# Patient Record
Sex: MALE | Race: WHITE | Employment: OTHER | URBAN - METROPOLITAN AREA
[De-identification: names, ages, dates, MRNs, and addresses within clinical notes are randomized per-mention and may not be internally consistent; named-entity substitution may affect disease eponyms.]

---

## 2017-06-29 ENCOUNTER — PATIENT OUTREACH (OUTPATIENT)
Dept: FAMILY MEDICINE CLINIC | Age: 67
End: 2017-06-29

## 2017-06-29 NOTE — LETTER
6/29/2017 3:12 PM 
 
Mr. Doris Morin [address] [address] [place] [address] I am a Nurse Navigator here at [address] working with Dr. Sharifa Randall. We are reaching out in efforts to schedule an appointment to facilitate an office visit and address gaps in care brought up to our attention by your insurance company. Anabelle Alvarado will like you to undergo a Medicare Wellness visit. Please call our office to facilitate. Thank you for allowing us to participate in your care!  
 
 
 
Sincerely, 
 
 
Leana Roth LPN

## 2017-06-29 NOTE — PROGRESS NOTES
Attempting to contact patient in efforts to schedule an appointment to address [facility]. I preceded to send a  letter requesting a return call to facilitate office visit.

## 2018-05-21 ENCOUNTER — PATIENT OUTREACH (OUTPATIENT)
Dept: FAMILY MEDICINE CLINIC | Age: 68
End: 2018-05-21

## 2018-05-21 NOTE — PROGRESS NOTES
Outreach made to this patient to schedule a Medicare Wellness Visit. This visit scheduled with Dr. Veronica Garcia on 6/4/18 at 4:05 pm. Contact information provided to this patient for any future questions or concerns.

## 2018-10-05 ENCOUNTER — PATIENT OUTREACH (OUTPATIENT)
Dept: FAMILY MEDICINE CLINIC | Age: 68
End: 2018-10-05

## 2018-10-05 NOTE — PROGRESS NOTES
Outreach made to this patient in efforts of scheduling an appointment for a Medicare Wellness Visit. Voice message left for this patient to return a call to this office to schedule this needed appointment.

## 2019-06-14 ENCOUNTER — TELEPHONE (OUTPATIENT)
Dept: FAMILY MEDICINE CLINIC | Age: 69
End: 2019-06-14

## 2019-06-14 NOTE — TELEPHONE ENCOUNTER
Called patient to schedule [facility] appointment. Offered patient appointments and he stated his schedule is \"tied up\" and will call back.

## 2021-03-09 ENCOUNTER — VIRTUAL VISIT (OUTPATIENT)
Dept: FAMILY MEDICINE CLINIC | Age: 71
End: 2021-03-09
Payer: MEDICARE

## 2021-03-09 DIAGNOSIS — I20.8 STABLE ANGINA (HCC): ICD-10-CM

## 2021-03-09 DIAGNOSIS — Z76.89 ENCOUNTER TO ESTABLISH CARE: Primary | ICD-10-CM

## 2021-03-09 DIAGNOSIS — I10 ESSENTIAL HYPERTENSION: ICD-10-CM

## 2021-03-09 PROBLEM — E78.00 PURE HYPERCHOLESTEROLEMIA: Status: ACTIVE | Noted: 2021-03-09

## 2021-03-09 PROBLEM — E11.9 CONTROLLED TYPE 2 DIABETES MELLITUS WITHOUT COMPLICATION, WITHOUT LONG-TERM CURRENT USE OF INSULIN (HCC): Status: ACTIVE | Noted: 2021-03-09

## 2021-03-09 PROBLEM — E55.9 VITAMIN D DEFICIENCY: Status: ACTIVE | Noted: 2021-03-09

## 2021-03-09 PROCEDURE — 3017F COLORECTAL CA SCREEN DOC REV: CPT | Performed by: STUDENT IN AN ORGANIZED HEALTH CARE EDUCATION/TRAINING PROGRAM

## 2021-03-09 PROCEDURE — G8427 DOCREV CUR MEDS BY ELIG CLIN: HCPCS | Performed by: STUDENT IN AN ORGANIZED HEALTH CARE EDUCATION/TRAINING PROGRAM

## 2021-03-09 PROCEDURE — G8432 DEP SCR NOT DOC, RNG: HCPCS | Performed by: STUDENT IN AN ORGANIZED HEALTH CARE EDUCATION/TRAINING PROGRAM

## 2021-03-09 PROCEDURE — G8756 NO BP MEASURE DOC: HCPCS | Performed by: STUDENT IN AN ORGANIZED HEALTH CARE EDUCATION/TRAINING PROGRAM

## 2021-03-09 PROCEDURE — 99204 OFFICE O/P NEW MOD 45 MIN: CPT | Performed by: STUDENT IN AN ORGANIZED HEALTH CARE EDUCATION/TRAINING PROGRAM

## 2021-03-09 PROCEDURE — 1101F PT FALLS ASSESS-DOCD LE1/YR: CPT | Performed by: STUDENT IN AN ORGANIZED HEALTH CARE EDUCATION/TRAINING PROGRAM

## 2021-03-09 RX ORDER — LISINOPRIL 20 MG/1
TABLET ORAL
COMMUNITY
Start: 2021-02-19 | End: 2021-11-03

## 2021-03-09 RX ORDER — ESCITALOPRAM OXALATE 5 MG/1
TABLET ORAL
COMMUNITY
Start: 2021-01-07

## 2021-03-09 RX ORDER — BLOOD-GLUCOSE METER
KIT MISCELLANEOUS
COMMUNITY
Start: 2021-01-07 | End: 2021-10-04 | Stop reason: SDUPTHER

## 2021-03-09 RX ORDER — AMLODIPINE BESYLATE 5 MG/1
TABLET ORAL
COMMUNITY
Start: 2021-02-19 | End: 2021-11-03

## 2021-03-09 NOTE — PROGRESS NOTES
Shana Torres  79 y.o. male  1950  [address] [facility]  [id number]   [address]:    Telemedicine Progress Note  Adrienne Adams MD       Encounter Date and Time: March 9, 2021 at 11:10 AM    Consent: Shana Torres, who was seen by synchronous (real-time) audio-video technology, and/or his healthcare decision maker, is aware that this patient-initiated, Telehealth encounter on 3/9/2021 is a billable service, with coverage as determined by his insurance carrier. He is aware that he may receive a bill and has provided verbal consent to proceed: Yes. Chief Complaint   Patient presents with    Establish Care     History of Present Illness   Shana Torres is a 79 y.o. male was evaluated by synchronous (real-time) audio-video technology from home, through a secure patient portal.  Patient presents today to establish care. He has been going to [facility] for blood work and medication refills. Used to follow with us back in 2017. Since then his wife had a stroke and he has been her caregiver. He provides me today with his medication list (updated in chart). Also tells me that he feels short of breath on exertion in the morning when taking out the trash. He also experiences some chest tightness on exertion and this resolves when he rests. He does not feel that way in the evenings or at rest. He does not have any symptoms when he is doing simple tasks at home. 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 Denies any current CP, SOB, palpitations or LE swelling. His vitals today are 148/75, HR 72 and glucose 100. He was placed on multiple BP meds in 04/2019 after attending one of his wife's medical appts and his BP was found to be 198/122. At the time he was only on Atenolol. Since then Amlodopine, Lisinipril were added.  He was also started on Escitalopram for mild depression in the setting of his wife's condition. Review of Systems   Review of Systems   Respiratory: Negative for shortness of breath. Cardiovascular: Negative for chest pain, palpitations and leg swelling. Vitals/Objective:     General: alert, cooperative, no distress   Mental  status: mental status: alert, oriented to person, place, and time, normal mood, behavior, speech, dress, motor activity, and thought processes   Resp: resp: normal effort and no respiratory distress   Neuro: neuro: no gross deficits   Skin: skin: no discoloration or lesions of concern on visible areas   Due to this being a TeleHealth evaluation, many elements of the physical examination are unable to be assessed. Assessment and Plan:   Valorie Delgado is a 79 y.o. male who presents to establish care. 1. Encounter to establish care  - chart and medications updated  - patient provided with our fax number to fax [facility] medical records/labs over today or tomorrow    2. Essential hypertension  - stable   - Continue medical regimen now, checking BP daily     3. Stable angina (HCC)  - symptoms point towards stable angina in light of patient's history and risk factors. - Considered echo stress however will refer to cardiology for evaluation and work up, patient may need cath   - ED precautions discussed with patient     We discussed the expected course, resolution and complications of the diagnosis(es) in detail. Medication risks, benefits, costs, interactions, and alternatives were discussed as indicated. I advised him to contact the office if his condition worsens, changes or fails to improve as anticipated. He expressed understanding with the diagnosis(es) and plan. Patient understands that this encounter was a temporary measure, and the importance of further follow up and examination was emphasized. Patient verbalized understanding. Patient informed to follow up: with medical records and following cardiac evaluation.     Electronically Signed: Kb Carver Paul Nava MD    Jarvis Whitehead is a 79 y.o. male who was evaluated by an audio-video encounter for concerns as above. Patient identification was verified prior to start of the visit. A caregiver was present when appropriate. Due to this being a TeleHealth encounter (During YTCEK-98 public health emergency), evaluation of the following organ systems was limited: Vitals/Constitutional/EENT/Resp/CV/GI//MS/Neuro/Skin/Heme-Lymph-Imm. Pursuant to the emergency declaration under the [facility], [facility] waiver authority and the [employer] Act, this Virtual Visit was conducted, with patient's (and/or legal guardian's) consent, to reduce the patient's risk of exposure to COVID-19 and provide necessary medical care. Services were provided through a synchronous discussion virtually to substitute for in-person clinic visit. I was at home. The patient was at home. History   Patients past medical, surgical and family histories were reviewed and updated.       Past Medical History:   Diagnosis Date    Diabetes ([facility] [address]     Hypercholesterolemia     Hypertension      Past Surgical History:   Procedure Laterality Date    HX TONSILLECTOMY       Family History   Problem Relation Age of Onset    Heart Attack Mother     Cancer Father      Social History     Tobacco Use    Smoking status: Never Smoker   Substance Use Topics    Alcohol use: Yes     Comment: AT NIGHT AT HOME    Drug use: No     Patient Active Problem List   Diagnosis Code    Essential hypertension I10    Pure hypercholesterolemia E78.00    Controlled type 2 diabetes mellitus without complication, without long-term current use of insulin (HCC) E11.9    Vitamin D deficiency E55.9          Current Medications/Allergies   Medications and Allergies reviewed:    Current Outpatient Medications   Medication Sig Dispense Refill    amLODIPine (NORVASC) 5 mg tablet       FreeStyle Lite Strips strip       escitalopram oxalate (LEXAPRO) 5 mg tablet       lisinopriL (PRINIVIL, ZESTRIL) 20 mg tablet       simvastatin (ZOCOR) 20 mg tablet Take 1 Tab by mouth nightly. 30 Tab 1    atenolol (TENORMIN) 100 mg tablet Take 1 Tab by mouth daily. 30 Tab 1    metFORMIN (GLUCOPHAGE) 1,000 mg tablet Take 1 Tab by mouth two (2) times daily (with meals). 90 Tab 1    fluticasone (FLONASE) 50 mcg/actuation nasal spray 2 Sprays by Both Nostrils route daily.  1 Bottle 0     Not on File

## 2021-03-10 NOTE — PROGRESS NOTES
[address] Medicine Residency Attending Addendum:  Dr. Yudith Parra MD,  the patient and I were not physically present during this encounter. The resident and I are concurrently monitoring the patient care through appropriate telecommunication technology. I discussed the findings, assessment and plan with the resident and agree with the resident's findings and plan as documented in the resident's note.       Catarina Evans MD

## 2021-03-11 ENCOUNTER — DOCUMENTATION ONLY (OUTPATIENT)
Dept: FAMILY MEDICINE CLINIC | Age: 71
End: 2021-03-11

## 2021-03-11 DIAGNOSIS — E11.9 CONTROLLED TYPE 2 DIABETES MELLITUS WITHOUT COMPLICATION, WITHOUT LONG-TERM CURRENT USE OF INSULIN (HCC): Primary | ICD-10-CM

## 2021-03-11 NOTE — PROGRESS NOTES
Records received from [facility]. Will scan into system. Of note, patient last had labwork done on 11/30/2020. TSH 1.79  HbA1c 6.9%   Alb/Cr ratio 93 (mildly increased, on lisinopril)   Lipid panel wnl.  LDL 86   CMP wnl, Cr 1.00  CBC ok, Hb 12.9 (low)     Will plan to obtain follow up A1c     Anahi Manley MD

## 2021-03-12 ENCOUNTER — TELEPHONE (OUTPATIENT)
Dept: FAMILY MEDICINE CLINIC | Age: 71
End: 2021-03-12

## 2021-03-12 NOTE — TELEPHONE ENCOUNTER
----- Message from [place] sent at 3/10/2021 11:22 AM EST -----  Regarding: Dr. Savanna Luciano first and last name:  Walkernathaniel Radha      Reason for call:  Pt would like to proceed with Dr. Maribel Emanuel recommendation for a cardiologist.  Pt was unable to schedule with his wife's cardiologist.  Requesting a call back to coordinate making the initial appt with Dr. Maribel Emanuel recommendation.        Callback required yes/no and why: yes      Best contact number(s):[phone]      Details to clarify the request:  [facility]

## 2021-03-12 NOTE — TELEPHONE ENCOUNTER
I updated patient's Cardio Ref order in CC and Cardio \"should\" call patient to schedule. Patient has [employer] which requires PA so after he gets his scheduled appt call us back with his appt info so that I can work on [address] to his scheduled appt.     Thanks  Ivania Brown S/PSR  [facility] Referral Coordinator

## 2021-03-16 ENCOUNTER — TELEPHONE (OUTPATIENT)
Dept: FAMILY MEDICINE CLINIC | Age: 71
End: 2021-03-16

## 2021-03-16 ENCOUNTER — NURSE TRIAGE (OUTPATIENT)
Dept: OTHER | Facility: CLINIC | Age: 71
End: 2021-03-16

## 2021-03-16 NOTE — TELEPHONE ENCOUNTER
----- Message from Eliazar Coppola sent at 3/16/2021  9:26 AM EDT -----  Regarding: Dr. King Shirley / Elizabet [place] first and last name: Trini Carpenter, wife      Reason for call: Caller informed that pt received the second dose of covid vaccine yesterday and is experiencing a low grade fever of 99.5. Caller also informed that pt is experiencing tightness in his chest and jaw pain as has happened in the past and she would like to request an EKG to be completed on the patient while he is in the lab for his blood work. Callback required yes/no and why: yes, to confirm additional request.      Best contact number(s): [phone]      Details to clarify the request: Pt is scheduled for labs at 10:30 AM today. Caller was transferred to nurse triage line.       Eliazar Coppola

## 2021-03-16 NOTE — TELEPHONE ENCOUNTER
Reason for Disposition   Chest pain lasting longer than 5 minutes and ANY of the following:* Over 39years old* Over 27years old and at least one cardiac risk factor (e.g., diabetes, high blood pressure, high cholesterol, smoker, or strong family history of heart disease)* History of heart disease (i.e., angina, heart attack, heart failure, bypass surgery, takes nitroglycerin)* Pain is crushing, pressure-like, or heavy    Answer Assessment - Initial Assessment Questions  1. LOCATION: \"Where does it hurt? \"        Upper chest, around the neck. 2. RADIATION: \"Does the pain go anywhere else? \" (e.g., into neck, jaw, arms, back)      Goes into jaw on both sides    3. ONSET: \"When did the chest pain begin? \" (Minutes, hours or days)       1 month ago, with exertion and has gotten worse. 4. PATTERN \"Does the pain come and go, or has it been constant since it started? \"  \"Does it get worse with exertion? \"       Comes and goes with exertion. This morning it occurred when he was walking to the bathroom. 5. DURATION: \"How long does it last\" (e.g., seconds, minutes, hours)      States it has been lasting all day today and yesterday. 6. SEVERITY: \"How bad is the pain? \"  (e.g., Scale 1-10; mild, moderate, or severe)     - MILD (1-3): doesn't interfere with normal activities      - MODERATE (4-7): interferes with normal activities or awakens from sleep     - SEVERE (8-10): excruciating pain, unable to do any normal activities        5-6/10    7. CARDIAC RISK FACTORS: \"Do you have any history of heart problems or risk factors for heart disease? \" (e.g., angina, prior heart attack; diabetes, high blood pressure, high cholesterol, smoker, or strong family history of heart disease)      HTN, Diabetes, drinks a lot of liquor,     8. PULMONARY RISK FACTORS: \"Do you have any history of lung disease? \"  (e.g., blood clots in lung, asthma, emphysema, birth control pills)      No     9. CAUSE: \"What do you think is causing the chest pain? \"      Unsure     10. OTHER SYMPTOMS: \"Do you have any other symptoms? \" (e.g., dizziness, nausea, vomiting, sweating, fever, difficulty breathing, cough)        Headache    11. PREGNANCY: \"Is there any chance you are pregnant? \" \"When was your last menstrual period? \"        N/a    Protocols used: CHEST PAIN-ADULT-[place]    Patient called [facility] with red flag complaint. Call received from -cold call. Brief description of triage: chest tightness radiating to jaw, headache. Triage indicates for patient to call 911; wife stated they would call as soon as we disconnected. Care advice provided, patient verbalizes understanding; denies any other questions or concerns; instructed to call back for any new or worsening symptoms. Attention Provider: Thank you for allowing me to participate in the care of your patient. The patient was connected to triage from [facility]. Please do not respond through this encounter as the response is not directed to a shared pool. I reached out to the number on file to assure they got in touch with 911. I got the voicemail.

## 2021-04-05 ENCOUNTER — TELEPHONE (OUTPATIENT)
Dept: FAMILY MEDICINE CLINIC | Age: 71
End: 2021-04-05

## 2021-04-05 NOTE — TELEPHONE ENCOUNTER
Pt wife calling, her  has a hosp fuv with you on 4/6/21 from heart attack  (had 17 day stay) from [facility] in also had quadruple bypass at [facility]. Pt was discharged 4/2/21. appt in office, declined VV. Has had both covid shots in no covid sx. The wife states they forgot to sign a release at the hospital in asking if you are able to obtain records. I told her I wasn't sure but I would inquire. I know I've heard nurse Dennis Crocker mention something in the past about some system used here to get records from other places. Please let me know in I will make wife aware.     Lorraine Boss [facility]) [phone] (M)         Thank you

## 2021-04-06 ENCOUNTER — OFFICE VISIT (OUTPATIENT)
Dept: FAMILY MEDICINE CLINIC | Age: 71
End: 2021-04-06
Payer: MEDICARE

## 2021-04-06 VITALS
SYSTOLIC BLOOD PRESSURE: 118 MMHG | RESPIRATION RATE: 16 BRPM | OXYGEN SATURATION: 97 % | HEART RATE: 87 BPM | DIASTOLIC BLOOD PRESSURE: 71 MMHG | TEMPERATURE: 97.8 F | HEIGHT: 69 IN | BODY MASS INDEX: 25.42 KG/M2 | WEIGHT: 171.6 LBS

## 2021-04-06 DIAGNOSIS — E78.00 PURE HYPERCHOLESTEROLEMIA: ICD-10-CM

## 2021-04-06 DIAGNOSIS — F10.10 ETOH ABUSE: ICD-10-CM

## 2021-04-06 DIAGNOSIS — Z95.1 S/P CABG X 4: ICD-10-CM

## 2021-04-06 DIAGNOSIS — E11.9 TYPE 2 DIABETES MELLITUS WITHOUT COMPLICATION, WITHOUT LONG-TERM CURRENT USE OF INSULIN (HCC): ICD-10-CM

## 2021-04-06 DIAGNOSIS — I25.10 CORONARY ARTERY DISEASE INVOLVING NATIVE HEART, UNSPECIFIED VESSEL OR LESION TYPE, UNSPECIFIED WHETHER ANGINA PRESENT: Primary | ICD-10-CM

## 2021-04-06 DIAGNOSIS — D64.9 ANEMIA, UNSPECIFIED TYPE: ICD-10-CM

## 2021-04-06 DIAGNOSIS — L03.115 CELLULITIS OF RIGHT LOWER EXTREMITY: ICD-10-CM

## 2021-04-06 DIAGNOSIS — I10 ESSENTIAL HYPERTENSION: ICD-10-CM

## 2021-04-06 PROCEDURE — 99213 OFFICE O/P EST LOW 20 MIN: CPT | Performed by: STUDENT IN AN ORGANIZED HEALTH CARE EDUCATION/TRAINING PROGRAM

## 2021-04-06 PROCEDURE — 3044F HG A1C LEVEL LT 7.0%: CPT | Performed by: STUDENT IN AN ORGANIZED HEALTH CARE EDUCATION/TRAINING PROGRAM

## 2021-04-06 PROCEDURE — 3017F COLORECTAL CA SCREEN DOC REV: CPT | Performed by: STUDENT IN AN ORGANIZED HEALTH CARE EDUCATION/TRAINING PROGRAM

## 2021-04-06 PROCEDURE — G8536 NO DOC ELDER MAL SCRN: HCPCS | Performed by: STUDENT IN AN ORGANIZED HEALTH CARE EDUCATION/TRAINING PROGRAM

## 2021-04-06 PROCEDURE — G8427 DOCREV CUR MEDS BY ELIG CLIN: HCPCS | Performed by: STUDENT IN AN ORGANIZED HEALTH CARE EDUCATION/TRAINING PROGRAM

## 2021-04-06 PROCEDURE — G8419 CALC BMI OUT NRM PARAM NOF/U: HCPCS | Performed by: STUDENT IN AN ORGANIZED HEALTH CARE EDUCATION/TRAINING PROGRAM

## 2021-04-06 PROCEDURE — 2022F DILAT RTA XM EVC RTNOPTHY: CPT | Performed by: STUDENT IN AN ORGANIZED HEALTH CARE EDUCATION/TRAINING PROGRAM

## 2021-04-06 PROCEDURE — 1101F PT FALLS ASSESS-DOCD LE1/YR: CPT | Performed by: STUDENT IN AN ORGANIZED HEALTH CARE EDUCATION/TRAINING PROGRAM

## 2021-04-06 PROCEDURE — G8432 DEP SCR NOT DOC, RNG: HCPCS | Performed by: STUDENT IN AN ORGANIZED HEALTH CARE EDUCATION/TRAINING PROGRAM

## 2021-04-06 PROCEDURE — G8754 DIAS BP LESS 90: HCPCS | Performed by: STUDENT IN AN ORGANIZED HEALTH CARE EDUCATION/TRAINING PROGRAM

## 2021-04-06 PROCEDURE — G8752 SYS BP LESS 140: HCPCS | Performed by: STUDENT IN AN ORGANIZED HEALTH CARE EDUCATION/TRAINING PROGRAM

## 2021-04-06 RX ORDER — ASPIRIN 325 MG
325 TABLET ORAL DAILY
Qty: 90 TAB | Refills: 2 | Status: SHIPPED | OUTPATIENT
Start: 2021-04-06 | End: 2022-01-12 | Stop reason: SDUPTHER

## 2021-04-06 RX ORDER — POTASSIUM CHLORIDE 1500 MG/1
20 TABLET, EXTENDED RELEASE ORAL
COMMUNITY
Start: 2021-04-02 | End: 2021-04-06 | Stop reason: SDUPTHER

## 2021-04-06 RX ORDER — POTASSIUM CHLORIDE 1500 MG/1
20 TABLET, EXTENDED RELEASE ORAL DAILY
Qty: 90 TAB | Refills: 2 | Status: SHIPPED | OUTPATIENT
Start: 2021-04-06 | End: 2021-11-03

## 2021-04-06 RX ORDER — ASPIRIN 325 MG
325 TABLET ORAL AS DIRECTED
COMMUNITY
Start: 2021-04-02 | End: 2021-04-06 | Stop reason: SDUPTHER

## 2021-04-06 RX ORDER — FUROSEMIDE 40 MG/1
TABLET ORAL
COMMUNITY
Start: 2021-04-02 | End: 2021-04-06 | Stop reason: SDUPTHER

## 2021-04-06 RX ORDER — ATORVASTATIN CALCIUM 40 MG/1
40 TABLET, FILM COATED ORAL DAILY
Qty: 90 TAB | Refills: 2 | Status: SHIPPED | OUTPATIENT
Start: 2021-04-06 | End: 2022-01-12 | Stop reason: SDUPTHER

## 2021-04-06 RX ORDER — FUROSEMIDE 40 MG/1
TABLET ORAL
Qty: 90 TAB | Refills: 2 | Status: SHIPPED | OUTPATIENT
Start: 2021-04-06 | End: 2021-11-03

## 2021-04-06 RX ORDER — METOPROLOL TARTRATE 25 MG/1
37.5 TABLET, FILM COATED ORAL 2 TIMES DAILY
COMMUNITY
Start: 2021-04-02 | End: 2021-04-06 | Stop reason: SDUPTHER

## 2021-04-06 RX ORDER — METOPROLOL TARTRATE 37.5 MG/1
37.5 TABLET, FILM COATED ORAL 2 TIMES DAILY
Qty: 90 TAB | Refills: 2 | Status: SHIPPED | OUTPATIENT
Start: 2021-04-06 | End: 2021-11-03 | Stop reason: DRUGHIGH

## 2021-04-06 RX ORDER — CEPHALEXIN 500 MG/1
CAPSULE ORAL
COMMUNITY
Start: 2021-04-02 | End: 2021-11-03

## 2021-04-06 NOTE — PROGRESS NOTES
Chief Complaint   Patient presents with   [facility]     Patient presents to follow up from [facility] by pass & Myocardial Infarction. Vitals:    04/06/21 1547   BP: 118/71   BP 1 Location: Left upper arm   BP Patient Position: Sitting   BP Cuff Size: Adult   Pulse: 87   Resp: 16   Temp: 97.8 °F (36.6 °C)   TempSrc: Temporal   SpO2: 97%   Weight: 171 lb 9.6 oz (77.8 kg)   Height: 5' 9\" (1.753 m)      1. Have you been to the ER, urgent care clinic since your last visit? Hospitalized since your last visit? No     2. Have you seen or consulted any other health care providers outside of the [address] [place] [address] since your last visit? Include any pap smears or colon screening.  No

## 2021-04-06 NOTE — PROGRESS NOTES
Michelle Swanson is an 79 y.o. male with a history of HTN, Type II DM, HLD, Vit D deficiency, and MI s/p quadruple bypass who presents for hospital follow up. Admitted to [address] [place] [address] on 3/27-4/2 for CAD s/p quadruple bypass. Patient initially presented with left jaw pain and SOB. Found to have diffuse ST depression and sent emergently to cath lab (intubated during procedure due to SOB) and then had CABG x4 done by DR. Payne. Labs significant for Hgb 9.0, MCV 89, , hemoccolut neg. Discharged with ASA 325mg daily, Lasix 40mg daily, Metoprolol 37.5mg BID, due to follow up with Dr. Mansi Valdovinos Cardiology, on 5/7 and Dr. Regulo Rodas Cardiothoracic Surgery, on 4/14. Denies chest pain, SOB, or jaw pain currently. Cleared by [facility] PT/OT; has nursing once weekly through [facility]. Denies smoking. Drinks 1/2 gallon in 3 days for 51 years. Noted redness and pain on distal RLE about 2 weeks ago and prescribed Keflex 500mg Q6H for cellulitis. Last dose today. Has had issues with reflux during hospital stay but hasn't required Pantoprazole 40mg daily. Allergies - reviewed: Allergies   Allergen Reactions    Amoxicillin Hives         Medications - reviewed:   Current Outpatient Medications   Medication Sig    atorvastatin (LIPITOR) 40 mg tablet Take 1 Tab by mouth daily.  metoprolol tartrate 37.5 mg tab Take 37.5 mg by mouth two (2) times a day.  furosemide (LASIX) 40 mg tablet TAKE ONE TABLET BY MOUTH ONE TIME DAILY    Klor-Con M20 20 mEq tablet Take 1 Tab by mouth daily.  aspirin (ASPIRIN) 325 mg tablet Take 1 Tab by mouth daily.  cephALEXin (KEFLEX) 500 mg capsule     amLODIPine (NORVASC) 5 mg tablet     FreeStyle Lite Strips strip     escitalopram oxalate (LEXAPRO) 5 mg tablet     lisinopriL (PRINIVIL, ZESTRIL) 20 mg tablet     metFORMIN (GLUCOPHAGE) 1,000 mg tablet Take 1 Tab by mouth two (2) times daily (with meals).     fluticasone (FLONASE) 50 mcg/actuation nasal spray 2 Sprays by Both Nostrils route daily. No current facility-administered medications for this visit. Past Medical History - reviewed:  Past Medical History:   Diagnosis Date    Diabetes ([address]     Hypercholesterolemia     Hypertension          Family History - reviewed:  Family History   Problem Relation Age of Onset    Heart Attack Mother     Cancer Father          Review of Systems   Constitutional: Negative for chills and fever. HENT: Negative for congestion and sore throat. Respiratory: Negative for cough and shortness of breath. Cardiovascular: Negative for chest pain and palpitations. Gastrointestinal: Negative for nausea and vomiting. Physical Exam  Visit Vitals  /71 (BP 1 Location: Left upper arm, BP Patient Position: Sitting, BP Cuff Size: Adult)   Pulse 87   Temp 97.8 °F (36.6 °C) (Temporal)   Resp 16   Ht 5' 9\" (1.753 m)   Wt 171 lb 9.6 oz (77.8 kg)   SpO2 97%   BMI 25.34 kg/m²       General appearance - Well-appearing, NAD  Chest - CTAB; no w/r/r  Heart - RRR; no m/r/g  Abdomen - Soft, NT  Neurological - Alert, no focal deficits  Skin - Two healing, scab lesions over distal RLE. Incision area c/d/i. Assessment/Plan    ICD-10-CM ICD-9-CM    1. Coronary artery disease involving native heart, unspecified vessel or lesion type, unspecified whether angina present  I25.10 414.01 LIPID PANEL      METABOLIC PANEL, COMPREHENSIVE      LIPID PANEL      METABOLIC PANEL, COMPREHENSIVE      atorvastatin (LIPITOR) 40 mg tablet      metoprolol tartrate 37.5 mg tab      furosemide (LASIX) 40 mg tablet      aspirin (ASPIRIN) 325 mg tablet   2. S/P CABG x 4  Z95.1 V45.81    3. Anemia, unspecified type  D64.9 285.9 CBC WITH AUTOMATED DIFF      IRON PROFILE      FERRITIN      VITAMIN B12 & FOLATE      LD      RETICULOCYTE COUNT      CBC WITH AUTOMATED DIFF      IRON PROFILE      FERRITIN      VITAMIN B12 & FOLATE      LD      RETICULOCYTE COUNT   4.  Type 2 diabetes mellitus without complication, without long-term current use of insulin (HCC)  E11.9 250.00 HEMOGLOBIN A1C WITH EAG      HEMOGLOBIN A1C WITH EAG   5. Essential hypertension  I10 401.9 metoprolol tartrate 37.5 mg tab      furosemide (LASIX) 40 mg tablet   6. Pure hypercholesterolemia  E78.00 272.0 atorvastatin (LIPITOR) 40 mg tablet   7. ETOH abuse  F10.10 305.00      CAD s/p CABG x4: Stable following recent admission.   -Refilled Metoprolol 37.5mg BID, ASA 325mg daily, Lasix 40mg daily, and Klor-con 20mg daily  -Switch to Lipitor 40mg daily  -Discussed optimizing risk factors; will check CMP, CBC, lipid, A1c    RLE cellulitis: At access site for CABG. -Complete course of Keflex today    Anemia: Hgb 9.0; unclear baseline. Likely due to ETOH abuse; denies blood loss. -Iron profile, ferritin, LD, retic count, B12, folate    ETOH abuse:  -Discussed abstaining from ETOH use     RTC 3 months to discuss chronic diseases    I have discussed the diagnosis with the patient and the intended plan as seen in the above orders. Patient verbalized understanding of the plan and agrees with the plan. The patient has received an after-visit summary and questions were answered concerning future plans. I have discussed medication side effects and warnings with the patient as well. Informed patient to return to the office if new symptoms arise.         Leida Verde MD  Family Medicine Resident

## 2021-04-07 ENCOUNTER — TELEPHONE (OUTPATIENT)
Dept: FAMILY MEDICINE CLINIC | Age: 71
End: 2021-04-07

## 2021-04-07 LAB
ALBUMIN SERPL-MCNC: 3.9 G/DL (ref 3.8–4.8)
ALBUMIN/GLOB SERPL: 1.2 {RATIO} (ref 1.2–2.2)
ALP SERPL-CCNC: 128 IU/L (ref 39–117)
ALT SERPL-CCNC: 10 IU/L (ref 0–44)
AST SERPL-CCNC: 23 IU/L (ref 0–40)
BASOPHILS # BLD AUTO: 0.1 X10E3/UL (ref 0–0.2)
BASOPHILS NFR BLD AUTO: 2 %
BILIRUB SERPL-MCNC: 0.5 MG/DL (ref 0–1.2)
BUN SERPL-MCNC: 13 MG/DL (ref 8–27)
BUN/CREAT SERPL: 11 (ref 10–24)
CALCIUM SERPL-MCNC: 9.9 MG/DL (ref 8.6–10.2)
CHLORIDE SERPL-SCNC: 102 MMOL/L (ref 96–106)
CHOLEST SERPL-MCNC: 114 MG/DL (ref 100–199)
CO2 SERPL-SCNC: 22 MMOL/L (ref 20–29)
CREAT SERPL-MCNC: 1.16 MG/DL (ref 0.76–1.27)
EOSINOPHIL # BLD AUTO: 0.3 X10E3/UL (ref 0–0.4)
EOSINOPHIL NFR BLD AUTO: 4 %
ERYTHROCYTE [DISTWIDTH] IN BLOOD BY AUTOMATED COUNT: 14 % (ref 11.6–15.4)
EST. AVERAGE GLUCOSE BLD GHB EST-MCNC: 126 MG/DL
FERRITIN SERPL-MCNC: 144 NG/ML (ref 30–400)
FOLATE SERPL-MCNC: 14 NG/ML
GLOBULIN SER CALC-MCNC: 3.3 G/DL (ref 1.5–4.5)
GLUCOSE SERPL-MCNC: 130 MG/DL (ref 65–99)
HBA1C MFR BLD: 6 % (ref 4.8–5.6)
HCT VFR BLD AUTO: 36.2 % (ref 37.5–51)
HDLC SERPL-MCNC: 31 MG/DL
HGB BLD-MCNC: 11.7 G/DL (ref 13–17.7)
IMM GRANULOCYTES # BLD AUTO: 0 X10E3/UL (ref 0–0.1)
IMM GRANULOCYTES NFR BLD AUTO: 1 %
IMP & REVIEW OF LAB RESULTS: NORMAL
IRON SATN MFR SERPL: 8 % (ref 15–55)
IRON SERPL-MCNC: 32 UG/DL (ref 38–169)
LDH SERPL-CCNC: 248 IU/L (ref 121–224)
LDLC SERPL CALC-MCNC: 59 MG/DL (ref 0–99)
LYMPHOCYTES # BLD AUTO: 0.8 X10E3/UL (ref 0.7–3.1)
LYMPHOCYTES NFR BLD AUTO: 11 %
Lab: NORMAL
MCH RBC QN AUTO: 27.9 PG (ref 26.6–33)
MCHC RBC AUTO-ENTMCNC: 32.3 G/DL (ref 31.5–35.7)
MCV RBC AUTO: 86 FL (ref 79–97)
MONOCYTES # BLD AUTO: 0.7 X10E3/UL (ref 0.1–0.9)
MONOCYTES NFR BLD AUTO: 10 %
NEUTROPHILS # BLD AUTO: 5.5 X10E3/UL (ref 1.4–7)
NEUTROPHILS NFR BLD AUTO: 72 %
PLATELET # BLD AUTO: 748 X10E3/UL (ref 150–450)
POTASSIUM SERPL-SCNC: 5 MMOL/L (ref 3.5–5.2)
PROT SERPL-MCNC: 7.2 G/DL (ref 6–8.5)
RBC # BLD AUTO: 4.19 X10E6/UL (ref 4.14–5.8)
RETICS/RBC NFR AUTO: 1.7 % (ref 0.6–2.6)
SODIUM SERPL-SCNC: 143 MMOL/L (ref 134–144)
TIBC SERPL-MCNC: 383 UG/DL (ref 250–450)
TRIGL SERPL-MCNC: 134 MG/DL (ref 0–149)
UIBC SERPL-MCNC: 351 UG/DL (ref 111–343)
VIT B12 SERPL-MCNC: 533 PG/ML (ref 232–1245)
VLDLC SERPL CALC-MCNC: 24 MG/DL (ref 5–40)
WBC # BLD AUTO: 7.6 X10E3/UL (ref 3.4–10.8)

## 2021-04-07 RX ORDER — FERROUS SULFATE 325(65) MG
325 TABLET, DELAYED RELEASE (ENTERIC COATED) ORAL
Qty: 90 TAB | Refills: 1 | Status: SHIPPED | OUTPATIENT
Start: 2021-04-07 | End: 2021-10-04 | Stop reason: SDUPTHER

## 2021-04-07 NOTE — PROGRESS NOTES
Hgb 11.7, improved from 9.0 during hospital stay. PLT remain elevated at 748 in the setting of acute infection. A1c improved and is in prediabetic range. AP slightly elevated, may be due to infection as previously nml. Will monitor for now. Iron studies show iron deficiency. Ferritin nml (may be falsely elevated as acute phase reactant). Will start iron supplement. B12, folate nml.      LDL at goal.

## 2021-04-07 NOTE — TELEPHONE ENCOUNTER
Discussed labs with patient. Sending daily iron supplement to pharmacy and would like to  OTC med for constipation if needed. Patient agreeable.

## 2021-04-12 LAB
BASOPHILS # BLD AUTO: NORMAL 10*3/UL
EOSINOPHIL # BLD AUTO: NORMAL 10*3/UL
EOSINOPHIL NFR BLD AUTO: NORMAL %
HCT VFR BLD AUTO: NORMAL %
HGB BLD-MCNC: NORMAL G/DL
LYMPHOCYTES # BLD AUTO: NORMAL 10*3/UL
LYMPHOCYTES NFR BLD AUTO: NORMAL %
MONOCYTES NFR BLD AUTO: NORMAL %
NEUTROPHILS NFR BLD AUTO: NORMAL %
PATH REV BLD -IMP: NORMAL
PATHOLOGIST NAME: NORMAL
PLATELET # BLD AUTO: NORMAL 10*3/UL
RBC # BLD AUTO: NORMAL 10*6/UL
SPECIMEN STATUS REPORT, ROLRST: NORMAL
WBC # BLD AUTO: NORMAL 10*3/UL

## 2021-06-22 ENCOUNTER — TELEPHONE (OUTPATIENT)
Dept: FAMILY MEDICINE CLINIC | Age: 71
End: 2021-06-22

## 2021-06-22 NOTE — TELEPHONE ENCOUNTER
Received perscription authorization request received by fax from [employer]  Requesting Centrum silver tablet  RX # [id number]

## 2021-06-29 ENCOUNTER — TELEPHONE (OUTPATIENT)
Dept: FAMILY MEDICINE CLINIC | Age: 71
End: 2021-06-29

## 2021-06-29 NOTE — TELEPHONE ENCOUNTER
Called, no answer. [facility] for appt        Message  Due: In 1 week Received: 2 months ago   Call patient, Personal reminder, Schedule follow-up appointment  Gladystine Mortimer, MD  [facility] [address] [facility]  Please place tickler for in person visit in  3 months for chronic disease follow up. Thanks.

## 2021-10-04 NOTE — TELEPHONE ENCOUNTER
[facility][phone]    They are adding 2 more medications. Centrum silver tabs  Free style lite test strips.

## 2021-10-06 ENCOUNTER — TELEPHONE (OUTPATIENT)
Dept: FAMILY MEDICINE CLINIC | Age: 71
End: 2021-10-06

## 2021-10-06 DIAGNOSIS — E11.9 CONTROLLED TYPE 2 DIABETES MELLITUS WITHOUT COMPLICATION, WITHOUT LONG-TERM CURRENT USE OF INSULIN (HCC): Primary | ICD-10-CM

## 2021-10-06 DIAGNOSIS — Z78.9 TAKES DAILY MULTIVITAMINS: Primary | ICD-10-CM

## 2021-10-06 RX ORDER — MULTIVIT-MIN/FA/LYCOPEN/LUTEIN .4-300-25
1 TABLET ORAL DAILY
Qty: 60 TABLET | Refills: 2 | Status: SHIPPED | OUTPATIENT
Start: 2021-10-06 | End: 2022-01-12 | Stop reason: SDUPTHER

## 2021-10-06 RX ORDER — FERROUS SULFATE 325(65) MG
325 TABLET, DELAYED RELEASE (ENTERIC COATED) ORAL
Qty: 90 TABLET | Refills: 1 | Status: SHIPPED | OUTPATIENT
Start: 2021-10-06 | End: 2022-03-22

## 2021-10-06 RX ORDER — BLOOD-GLUCOSE METER
KIT MISCELLANEOUS
Qty: 90 STRIP | Refills: 2 | Status: SHIPPED | OUTPATIENT
Start: 2021-10-06 | End: 2021-10-14 | Stop reason: SDUPTHER

## 2021-10-06 NOTE — TELEPHONE ENCOUNTER
Elizabeth Crocker with [facility] calling, script that was sent order for test strips is not medicare compliant. New Script is needed with DX code in also 100 test strips.         FreeStyle Lite Strips strip 90 Strip 2 10/6/2021     Sig: Use to check blood sugars up to 4 times daily    Sent to pharmacy as: FreeStyle Lite Strips    E-Prescribing Status: Receipt confirmed by pharmacy (10/6/2021  6:37 AM EDT)      Ph [phone]

## 2021-10-08 NOTE — TELEPHONE ENCOUNTER
[phone]    Called to speak with the doctor. Please call TODAY.

## 2021-10-14 RX ORDER — BLOOD-GLUCOSE METER
KIT MISCELLANEOUS
Qty: 100 STRIP | Refills: 3 | Status: SHIPPED | OUTPATIENT
Start: 2021-10-14

## 2021-10-14 NOTE — TELEPHONE ENCOUNTER
[facility],  [address] [place] [address] to say this script issue has not been resolved. She was informed Dr. Ana Paula Interiano would address.

## 2021-10-14 NOTE — TELEPHONE ENCOUNTER
Returned call to pharmacist, Moses Martin. Confirmed patient ID x 2. Reviewed and approved appropriate test strip script. Sent to [facility] pharmacy. Will arrange for patient to have follow up with me in November.      Law Kelley MD

## 2021-10-27 NOTE — TELEPHONE ENCOUNTER
Good Afternoon ,  I received a message stating that the patient is requesting a refill for:  metFORMIN (GLUCOPHAGE) 1,000 mg tablet  To be sent to the pharmacy. This was one of 's patients that was seen 04/06/2021. Can you help me with this please?   Thank you

## 2021-10-28 ENCOUNTER — TELEPHONE (OUTPATIENT)
Dept: FAMILY MEDICINE CLINIC | Age: 71
End: 2021-10-28

## 2021-10-28 DIAGNOSIS — E11.9 CONTROLLED TYPE 2 DIABETES MELLITUS WITHOUT COMPLICATION, WITHOUT LONG-TERM CURRENT USE OF INSULIN (HCC): Primary | ICD-10-CM

## 2021-10-28 DIAGNOSIS — I25.10 CORONARY ARTERY DISEASE INVOLVING NATIVE HEART, UNSPECIFIED VESSEL OR LESION TYPE, UNSPECIFIED WHETHER ANGINA PRESENT: ICD-10-CM

## 2021-10-28 RX ORDER — METFORMIN HYDROCHLORIDE 1000 MG/1
1000 TABLET ORAL 2 TIMES DAILY WITH MEALS
Qty: 90 TABLET | Refills: 1 | Status: SHIPPED | OUTPATIENT
Start: 2021-10-28 | End: 2021-10-30

## 2021-10-28 NOTE — TELEPHONE ENCOUNTER
Constantin called, stated the quanity of the prescription for Metformin was for 90 tabs, but the patient is to take it 2x a day, only good for 45 days.

## 2021-10-30 RX ORDER — METFORMIN HYDROCHLORIDE 1000 MG/1
1000 TABLET ORAL 2 TIMES DAILY WITH MEALS
Qty: 180 TABLET | Refills: 1 | Status: SHIPPED | OUTPATIENT
Start: 2021-10-30 | End: 2022-01-12 | Stop reason: SDUPTHER

## 2021-10-30 NOTE — TELEPHONE ENCOUNTER
Dispense amount changed and refill sent to the pharmacy again. Called pharmacy to confirm patient had not picked up previous prescription.

## 2021-11-02 NOTE — PROGRESS NOTES
Abel Bojorquez is a 70 y.o. male    Chief Complaint   Patient presents with    Annual Wellness Visit     would like bloodwork to check his liver. checks his blood sugar every day - stays in the 100-110 range     General Health Questions   -During the past 4 weeks:   -how would you rate your health in general? Good   -how often have you been bothered by feeling dizzy when standing up? Some days   -how much have you been bothered by bodily pain? mildly   -Have you noticed any hearing difficulties? no   -has your physical and emotional health limited your social activities with family or friends? yes    Emotional Health Questions   -Do you have a history of depression, anxiety, or emotional problems? yes  -Over the past 2 weeks, have you felt down, depressed or hopeless? no  -Over the past 2 weeks, have you felt little interest or pleasure in doing things? no    Health Habits   Please describe your diet habits: cereal and juice. [place] and diet drinks. Meat or fish, 2 veggies, sometimes bread. Do you get 5 servings of fruits or vegetables daily? no  Do you exercise regularly? yes    Activities of Daily Living and Functional Status   -Do you need help with eating, walking, dressing, bathing, toileting, the phone, transportation, shopping, preparing meals, housework, laundry, medications or managing money? no  -In the past four weeks, was someone available to help you if you needed and wanted help with anything? no  -Are you confident are you that you can control and manage most of your health problems? yes  -Have you been given information to help you keep track of your medications? yes  -How often do you have trouble taking your medications as prescribed? never    Fall Risk and Home Safety   Have you fallen 2 or more times in the past year? no  Does your home have rugs in the hallways? yes, Do you have grab bars in the bathrooms? yes, Does your home have handrails on the stairs?  yes, Do you have adequate lighting in your home? yes  Do you have smoke detectors and check them regularly? yes  Do you have difficulties driving a car/vehicle? no  Do you always fasten your seat belt when you are in a car? yes    1. Have you been to the ER, urgent care clinic since your last visit? Hospitalized since your last visit? No    2. Have you seen or consulted any other health care providers outside of the [address] [place] [address] Denis since your last visit? Include any pap smears or colon screening. No      Vitals:    11/03/21 1322 11/03/21 1400   BP: (!) 159/84 (!) 152/86   BP 1 Location: Left upper arm Left upper arm   BP Patient Position: Sitting Sitting   BP Cuff Size: Adult Adult   Pulse: 67    Temp: 97.1 °F (36.2 °C)    TempSrc: Temporal    Resp: 18    Height: 5' 9\" (1.753 m)    Weight: 183 lb (83 kg)    SpO2: 98%            Health Maintenance Due   Topic Date Due    Hepatitis C Screening  Never done    Colorectal Cancer Screening Combo  Never done    MICROALBUMIN Q1  11/30/2021         Medication Reconciliation completed, changes noted.   Please  Update medication list.

## 2021-11-03 ENCOUNTER — OFFICE VISIT (OUTPATIENT)
Dept: FAMILY MEDICINE CLINIC | Age: 71
End: 2021-11-03
Payer: MEDICARE

## 2021-11-03 VITALS
WEIGHT: 183 LBS | BODY MASS INDEX: 27.11 KG/M2 | HEIGHT: 69 IN | TEMPERATURE: 97.1 F | DIASTOLIC BLOOD PRESSURE: 86 MMHG | SYSTOLIC BLOOD PRESSURE: 152 MMHG | OXYGEN SATURATION: 98 % | RESPIRATION RATE: 18 BRPM | HEART RATE: 67 BPM

## 2021-11-03 DIAGNOSIS — Z12.11 SCREEN FOR COLON CANCER: ICD-10-CM

## 2021-11-03 DIAGNOSIS — E78.00 PURE HYPERCHOLESTEROLEMIA: ICD-10-CM

## 2021-11-03 DIAGNOSIS — L98.9 LESION OF FACE: ICD-10-CM

## 2021-11-03 DIAGNOSIS — E11.9 CONTROLLED TYPE 2 DIABETES MELLITUS WITHOUT COMPLICATION, WITHOUT LONG-TERM CURRENT USE OF INSULIN (HCC): ICD-10-CM

## 2021-11-03 DIAGNOSIS — Z95.1 S/P CABG X 4: ICD-10-CM

## 2021-11-03 DIAGNOSIS — Z71.89 ADVANCED DIRECTIVES, COUNSELING/DISCUSSION: ICD-10-CM

## 2021-11-03 DIAGNOSIS — R20.8 DECREASED SENSATION OF FOOT: ICD-10-CM

## 2021-11-03 DIAGNOSIS — Z11.59 NEED FOR HEPATITIS C SCREENING TEST: ICD-10-CM

## 2021-11-03 DIAGNOSIS — I25.810 CORONARY ARTERY DISEASE INVOLVING CORONARY BYPASS GRAFT OF NATIVE HEART WITHOUT ANGINA PECTORIS: ICD-10-CM

## 2021-11-03 DIAGNOSIS — I10 ESSENTIAL HYPERTENSION: Primary | ICD-10-CM

## 2021-11-03 DIAGNOSIS — Z00.00 INITIAL MEDICARE ANNUAL WELLNESS VISIT: ICD-10-CM

## 2021-11-03 DIAGNOSIS — T14.8XXA NONHEALING NONSURGICAL WOUND: ICD-10-CM

## 2021-11-03 PROCEDURE — G0438 PPPS, INITIAL VISIT: HCPCS | Performed by: FAMILY MEDICINE

## 2021-11-03 PROCEDURE — 3044F HG A1C LEVEL LT 7.0%: CPT | Performed by: FAMILY MEDICINE

## 2021-11-03 PROCEDURE — 99214 OFFICE O/P EST MOD 30 MIN: CPT | Performed by: FAMILY MEDICINE

## 2021-11-03 PROCEDURE — G8419 CALC BMI OUT NRM PARAM NOF/U: HCPCS | Performed by: FAMILY MEDICINE

## 2021-11-03 PROCEDURE — G8754 DIAS BP LESS 90: HCPCS | Performed by: FAMILY MEDICINE

## 2021-11-03 PROCEDURE — G8753 SYS BP > OR = 140: HCPCS | Performed by: FAMILY MEDICINE

## 2021-11-03 PROCEDURE — G8427 DOCREV CUR MEDS BY ELIG CLIN: HCPCS | Performed by: FAMILY MEDICINE

## 2021-11-03 PROCEDURE — 1101F PT FALLS ASSESS-DOCD LE1/YR: CPT | Performed by: FAMILY MEDICINE

## 2021-11-03 PROCEDURE — G8510 SCR DEP NEG, NO PLAN REQD: HCPCS | Performed by: FAMILY MEDICINE

## 2021-11-03 PROCEDURE — G8536 NO DOC ELDER MAL SCRN: HCPCS | Performed by: FAMILY MEDICINE

## 2021-11-03 PROCEDURE — 3017F COLORECTAL CA SCREEN DOC REV: CPT | Performed by: FAMILY MEDICINE

## 2021-11-03 PROCEDURE — 2022F DILAT RTA XM EVC RTNOPTHY: CPT | Performed by: FAMILY MEDICINE

## 2021-11-03 RX ORDER — METOPROLOL SUCCINATE 25 MG/1
12.5 TABLET, EXTENDED RELEASE ORAL DAILY
COMMUNITY
Start: 2021-10-30

## 2021-11-03 RX ORDER — FERROUS SULFATE TAB 325 MG (65 MG ELEMENTAL FE) 325 (65 FE) MG
325 TAB ORAL
COMMUNITY
Start: 2021-10-08 | End: 2021-11-03

## 2021-11-03 RX ORDER — LOSARTAN POTASSIUM 25 MG/1
TABLET ORAL
COMMUNITY
Start: 2021-10-26

## 2021-11-03 NOTE — ACP (ADVANCE CARE PLANNING)
Advance Care Planning     Advance Care Planning (ACP) Physician/NP/PA Conversation      Date of Conversation: 11/3/2021  Conducted with: Patient with Decision Making Capacity    Healthcare Decision Maker:   No healthcare decision makers have been documented. Click here to complete [address] including selection of the Healthcare Decision Maker Relationship (ie \"Primary\")      Today we documented Decision Maker(s) consistent with Legal Next of Kin hierarchy. However, pt considering making his daughter his decision maker. Discussed need for ACP documentation. Care Preferences:    Hospitalization: \"If your health worsens and it becomes clear that your chance of recovery is unlikely, what would be your preference regarding hospitalization? \"  The patient would prefer hospitalization. Ventilation: \"If you were unable to breathe on your own and your chance of recovery was unlikely, what would be your preference about the use of a ventilator (breathing machine) if it was available to you? \"   The patient would desire the use of a ventilator. Resuscitation: \"In the event your heart stopped as a result of an underlying serious health condition, would you want attempts to be made to restart your heart, or would you prefer a natural death? \"   Yes, attempt to resuscitate.     Additional topics discussed: treatment goals, ventilation preferences and resuscitation preferences    Conversation Outcomes / Follow-Up Plan:   ACP in process - information provided, considering goals and options  Reviewed DNR/DNI and patient elects Full Code (Attempt Resuscitation)     Length of Voluntary ACP Conversation in minutes:  <16 minutes (Non-Billable)    Pari Cuevas MD

## 2021-11-03 NOTE — PROGRESS NOTES
History of Present Illness:     Chief Complaint   [facility] presents with   [facility]     would like bloodwork to check his liver. checks his blood sugar every day - stays in the 100-110 range       Abel Bojorquez is a 70 y.o. male     Wellness and follow up of chronic conditions. DM. Last A1c 6.0%. Taking statin and Metformin. Home BGs fasting between 100-110s. CAD s/p CABG x 4. Denies CP, palpitations, SOB. Followed by his cardiologist. Compliant with BB, ASA and statin.     HLD. Compliant with statin. HTN. Taking BB, Losartan as prescribed. Initial BP elevated in office. Hx of EtOH use. Stopped after his heart attack. PMH (REVIEWED):  Past Medical History:   Diagnosis Date    Diabetes ([facility] [address]     Hypercholesterolemia     Hypertension        Current Medications/Allergies (REVIEWED):     Current Outpatient Medications on File Prior to Visit   Medication Sig Dispense Refill    losartan (COZAAR) 25 mg tablet       metoprolol succinate (TOPROL-XL) 25 mg XL tablet Take 12.5 mg by mouth daily.  metFORMIN (GLUCOPHAGE) 1,000 mg tablet Take 1 Tablet by mouth two (2) times daily (with meals) for 180 days. 180 Tablet 1    glucose blood VI test strips (FreeStyle Lite Strips) strip Use to check blood sugars once daily 100 Strip 3    ferrous sulfate (IRON) 325 mg (65 mg iron) EC tablet Take 1 Tablet by mouth Daily (before breakfast). 90 Tablet 1    multivit-min-FA-lycopen-lutein (Centrum Silver) 0.4-300-250 mg-mcg-mcg tab Take 1 Tablet by mouth daily. 60 Tablet 2    atorvastatin (LIPITOR) 40 mg tablet Take 1 Tab by mouth daily. 90 Tab 2    aspirin (ASPIRIN) 325 mg tablet Take 1 Tab by mouth daily. 90 Tab 2    escitalopram oxalate (LEXAPRO) 5 mg tablet       fluticasone (FLONASE) 50 mcg/actuation nasal spray 2 Sprays by Both Nostrils route daily.  (Patient taking differently: 2 Sprays by Both Nostrils route daily as needed.) 1 Bottle 0    FeroSuL 325 mg (65 mg iron) tablet Take 325 mg by mouth Daily (before breakfast). (Patient not taking: Reported on 11/3/2021)      cephALEXin (KEFLEX) 500 mg capsule  (Patient not taking: Reported on 11/3/2021)      metoprolol tartrate 37.5 mg tab Take 37.5 mg by mouth two (2) times a day. (Patient not taking: Reported on 11/3/2021) 90 Tab 2    furosemide (LASIX) 40 mg tablet TAKE ONE TABLET BY MOUTH ONE TIME DAILY (Patient not taking: Reported on 11/3/2021) 90 Tab 2    Klor-Con M20 20 mEq tablet Take 1 Tab by mouth daily. (Patient not taking: Reported on 11/3/2021) 90 Tab 2    amLODIPine (NORVASC) 5 mg tablet  (Patient not taking: Reported on 11/3/2021)       No current facility-administered medications on file prior to visit. Allergies   Allergen Reactions    Amoxicillin Hives         Review of Systems:     Denies fever, chills, sweats  Denies chest pain, VINCENT, palpitations, LE edema  Denies cough, sputum production, SOB, pleuritic chest pain, wheezing  Denies n/v/d, constipation, melena, blood in stool  Denies numbness/ tingling/ weakness in extremities      Objective:     Vitals:    11/03/21 1322   BP: (!) 159/84   Pulse: 67   Resp: 18   Temp: 97.1 °F (36.2 °C)   TempSrc: Temporal   SpO2: 98%   Weight: 183 lb (83 kg)   Height: 5' 9\" (1.753 m)       Physical Exam:  General appearance - alert, well appearing, and in no distress  Chest - clear to auscultation, no wheezes, rales or rhonchi, symmetric air entry  Heart - normal rate, regular rhythm, normal S1, S2, no murmurs, rubs, clicks or gallops  Neurological - alert, oriented, normal speech, no focal findings or movement disorder noted  Extremities - peripheral pulses normal, no pedal edema, no clubbing or cyanosis, feet normal, good pulses, normal color, temperature and sensation, monofilament sensory exam is ABNORMAL in both feet  Skin - Poorly healing ulceration behind right ear. Recent Labs:  No results found for this or any previous visit (from the past 12 hour(s)).     Assessment and Plan:       ICD-10-CM ICD-9-CM    1. Essential hypertension  I10 401.9 CBC W/O DIFF      METABOLIC PANEL, COMPREHENSIVE   2. Coronary artery disease involving coronary bypass graft of native heart without angina pectoris  I25.810 414.05    3. Controlled type 2 diabetes mellitus without complication, without long-term current use of insulin (HCC)  E11.9 250.00 MICROALBUMIN, UR, RAND W/ MICROALB/CREAT RATIO       DIABETES FOOT EXAM      CBC W/O DIFF      METABOLIC PANEL, COMPREHENSIVE      HEMOGLOBIN A1C WITH EAG   4. Pure hypercholesterolemia  E78.00 272.0    5. S/P CABG x 4  Z95.1 V45.81    6. Advanced directives, counseling/discussion  Z71.89 V65.49 FULL CODE   7. Initial Medicare annual wellness visit  Z00.00 V70.0    8. Screen for colon cancer  Z12.11 V76.51 OCCULT BLOOD IMMUNOASSAY,DIAGNOSTIC   9. Need for hepatitis C screening test  Z11.59 V73.89 HEPATITIS C AB   10. Decreased sensation of foot  R20.8 782.0 REFERRAL TO PODIATRY   11. Lesion of face  L98.9 709.9 REFERRAL TO DERMATOLOGY       HTN. Rpt BP about the same. Followed closely by his cardiologist who recently adjusted his BP meds. Cont current meds and follow up with Cardiologist. Cont home BP checks; home readings in 110s/70s. CAD s/p CABG. Continues to have close Cardiology follow up. T2DM. Check A1c. Decreased sensation to monofilament testing bilaterally. Referred to Podiatry. HLD. Cont statin. Will do FIT for colon cancer screening given recent CABG. See below for [facility]. Referred to Derm for poorly healing wound behind right ear present for 2+ years. Would benefit from regular skin checks. Follow up: 6 mo    Shiva Sears MD    We discussed the expected course, resolution and complications of the diagnosis(es) in detail. Medication risks, benefits, costs, interactions, and alternatives were discussed as indicated.   I advised him to contact the office if his condition worsens, changes or fails to improve as anticipated. He expressed understanding with the diagnosis(es) and plan. This is an Initial Medicare Annual Wellness Exam (AWV) (Performed 12 months after IPPE or effective date of Medicare Part B enrollment, Once in a lifetime)    I have reviewed the patient's medical history in detail and updated the computerized patient record. Assessment/Plan   Education and counseling provided:  Are appropriate based on today's review and evaluation  End-of-Life planning (with patient's consent)  Colorectal cancer screening tests    1. Essential hypertension  -     CBC W/O DIFF; Future  -     METABOLIC PANEL, COMPREHENSIVE; Future  2. Coronary artery disease involving coronary bypass graft of native heart without angina pectoris  3. Controlled type 2 diabetes mellitus without complication, without long-term current use of insulin (HCC)  -     MICROALBUMIN, UR, RAND W/ MICROALB/CREAT RATIO; Future  -      DIABETES FOOT EXAM  -     CBC W/O DIFF; Future  -     METABOLIC PANEL, COMPREHENSIVE; Future  -     HEMOGLOBIN A1C WITH EAG; Future  4. Pure hypercholesterolemia  5. S/P CABG x 4  6. Advanced directives, counseling/discussion  -     FULL CODE  7. Initial Medicare annual wellness visit  8. Screen for colon cancer  -     OCCULT BLOOD IMMUNOASSAY,DIAGNOSTIC  9. Need for hepatitis C screening test  -     HEPATITIS C AB; Future  10.  Decreased sensation of foot  -     REFERRAL TO PODIATRY  11. Lesion of face  -     REFERRAL TO DERMATOLOGY       Depression Risk Factor Screening     3 most recent PHQ Screens 4/6/2021   Little interest or pleasure in doing things Not at all   Feeling down, depressed, irritable, or hopeless Not at all   Total Score PHQ 2 0       Alcohol Risk Screen    Do you average more than 1 drink per night or more than 7 drinks a week: No    In the past three months have you have had more than 4 drinks containing alcohol on one occasion: No         Functional Ability and Level of Safety    Hearing: Hearing is good. Activities of Daily Living: The home contains: handrails and grab bars  Patient does total self care     Ambulation: with no difficulty      Fall Risk:  Fall Risk Assessment, last 12 mths 4/6/2021   Able to walk? Yes   Fall in past 12 months? 0   Do you feel unsteady? 1   Are you worried about falling 0   Is the gait abnormal? 1   Number of falls in past 12 months 0      Abuse Screen:  Patient is not abused       Cognitive Screening    Has your family/caregiver stated any concerns about your memory: no    Health Maintenance Due     Health Maintenance Due   Topic Date Due    Hepatitis C Screening  Never done    Eye Exam Retinal or Dilated  Never done    Colorectal Cancer Screening Combo  Never done    Medicare Yearly Exam  Never done    MICROALBUMIN Q1  11/30/2021       Patient Care Team   Patient Care Team:  Rebecca Pérez MD as PCP - General (Family Medicine)  Rbeecca Pérez MD as PCP - [facility]  Keith Powell RN as Ambulatory Care Manager (Family Medicine)    History     Patient Active Problem List   Diagnosis Code    Essential hypertension I10    Pure hypercholesterolemia E78.00    Controlled type 2 diabetes mellitus without complication, without long-term current use of insulin ([address] E11.9    Vitamin D deficiency E55.9    ETOH abuse F10.10    Coronary artery disease involving native heart I25.10    S/P CABG x 4 Z95.1     Past Medical History:   Diagnosis Date    Diabetes ([address]     Hypercholesterolemia     Hypertension       Past Surgical History:   Procedure Laterality Date    HX TONSILLECTOMY       Current Outpatient Medications   Medication Sig Dispense Refill    losartan (COZAAR) 25 mg tablet       metoprolol succinate (TOPROL-XL) 25 mg XL tablet Take 12.5 mg by mouth daily.  metFORMIN (GLUCOPHAGE) 1,000 mg tablet Take 1 Tablet by mouth two (2) times daily (with meals) for 180 days.  180 Tablet 1    glucose blood VI test strips (FreeStyle Lite Strips) strip Use to check blood sugars once daily 100 Strip 3    ferrous sulfate (IRON) 325 mg (65 mg iron) EC tablet Take 1 Tablet by mouth Daily (before breakfast). 90 Tablet 1    multivit-min-FA-lycopen-lutein (Centrum Silver) 0.4-300-250 mg-mcg-mcg tab Take 1 Tablet by mouth daily. 60 Tablet 2    atorvastatin (LIPITOR) 40 mg tablet Take 1 Tab by mouth daily. 90 Tab 2    aspirin (ASPIRIN) 325 mg tablet Take 1 Tab by mouth daily. 90 Tab 2    escitalopram oxalate (LEXAPRO) 5 mg tablet       fluticasone (FLONASE) 50 mcg/actuation nasal spray 2 Sprays by Both Nostrils route daily. (Patient taking differently: 2 Sprays by Both Nostrils route daily as needed.) 1 Bottle 0    FeroSuL 325 mg (65 mg iron) tablet Take 325 mg by mouth Daily (before breakfast). (Patient not taking: Reported on 11/3/2021)      cephALEXin (KEFLEX) 500 mg capsule  (Patient not taking: Reported on 11/3/2021)      metoprolol tartrate 37.5 mg tab Take 37.5 mg by mouth two (2) times a day. (Patient not taking: Reported on 11/3/2021) 90 Tab 2    furosemide (LASIX) 40 mg tablet TAKE ONE TABLET BY MOUTH ONE TIME DAILY (Patient not taking: Reported on 11/3/2021) 90 Tab 2    Klor-Con M20 20 mEq tablet Take 1 Tab by mouth daily.  (Patient not taking: Reported on 11/3/2021) 90 Tab 2    amLODIPine (NORVASC) 5 mg tablet  (Patient not taking: Reported on 11/3/2021)       Allergies   Allergen Reactions    Amoxicillin Hives       Family History   Problem Relation Age of Onset    Heart Attack Mother     Cancer Father      Social History     Tobacco Use    Smoking status: Never Smoker    Smokeless tobacco: Never Used   Substance Use Topics    Alcohol use: Yes     Comment: AT Tatum Mendes MD

## 2021-11-03 NOTE — PATIENT INSTRUCTIONS
Medicare Wellness Visit, Male The best way to live healthy is to have a lifestyle where you eat a well-balanced diet, exercise regularly, limit alcohol use, and quit all forms of tobacco/nicotine, if applicable. Regular preventive services are another way to keep healthy. Preventive services (vaccines, screening tests, monitoring & exams) can help personalize your care plan, which helps you manage your own care. Screening tests can find health problems at the earliest stages, when they are easiest to treat. [facility] follows the current, evidence-based guidelines published by the [facility]tor Nasrin ([facility]) when recommending preventive services for our patients. Because we follow these guidelines, sometimes recommendations change over time as research supports it. (For example, a prostate screening blood test is no longer routinely recommended for men with no symptoms). Of course, you and your doctor may decide to screen more often for some diseases, based on your risk and co-morbidities (chronic disease you are already diagnosed with). Preventive services for you include: - Medicare offers their members a free annual wellness visit, which is time for you and your primary care provider to discuss and plan for your preventive service needs. Take advantage of this benefit every year! 
-All adults over age 72 should receive the recommended pneumonia vaccines. Current USPSTF guidelines recommend a series of two vaccines for the best pneumonia protection.  
-All adults should have a flu vaccine yearly and tetanus vaccine every 10 years. 
-All adults age 48 and older should receive the shingles vaccines (series of two vaccines).       
-All adults age 38-68 who are overweight should have a diabetes screening test once every three years.  
-Other screening tests & preventive services for persons with diabetes include: an eye exam to screen for diabetic retinopathy, a kidney function test, a foot exam, and stricter control over your cholesterol.  
-Cardiovascular screening for adults with routine risk involves an electrocardiogram (ECG) at intervals determined by the provider.  
-Colorectal cancer screening should be done for adults age 54-65 with no increased risk factors for colorectal cancer. There are a number of acceptable methods of screening for this type of cancer. Each test has its own benefits and drawbacks. Discuss with your provider what is most appropriate for you during your annual wellness visit. The different tests include: colonoscopy (considered the best screening method), a fecal occult blood test, a fecal DNA test, and sigmoidoscopy. 
-All adults born between [facility] should be screened once for Hepatitis C. 
-An Abdominal Aortic Aneurysm (AAA) Screening is recommended for men age 73-68 who has ever smoked in their lifetime. Here is a list of your current Health Maintenance items (your personalized list of preventive services) with a due date: 
Health Maintenance Due Topic Date Due  
 Hepatitis C Test  Never done  Colorectal Screening  Never done  Annual Well Visit  Never done  Albumin Urine Test  11/30/2021

## 2021-11-04 LAB
ALBUMIN SERPL-MCNC: 4.6 G/DL (ref 3.7–4.7)
ALBUMIN/CREAT UR: 7 MG/G CREAT (ref 0–29)
ALBUMIN/GLOB SERPL: 1.5 {RATIO} (ref 1.2–2.2)
ALP SERPL-CCNC: 119 IU/L (ref 44–121)
ALT SERPL-CCNC: 24 IU/L (ref 0–44)
AST SERPL-CCNC: 21 IU/L (ref 0–40)
BILIRUB SERPL-MCNC: 0.4 MG/DL (ref 0–1.2)
BUN SERPL-MCNC: 16 MG/DL (ref 8–27)
BUN/CREAT SERPL: 14 (ref 10–24)
CALCIUM SERPL-MCNC: 10.6 MG/DL (ref 8.6–10.2)
CHLORIDE SERPL-SCNC: 101 MMOL/L (ref 96–106)
CO2 SERPL-SCNC: 25 MMOL/L (ref 20–29)
CREAT SERPL-MCNC: 1.18 MG/DL (ref 0.76–1.27)
CREAT UR-MCNC: 153 MG/DL
ERYTHROCYTE [DISTWIDTH] IN BLOOD BY AUTOMATED COUNT: 13.7 % (ref 11.6–15.4)
EST. AVERAGE GLUCOSE BLD GHB EST-MCNC: 151 MG/DL
GLOBULIN SER CALC-MCNC: 3.1 G/DL (ref 1.5–4.5)
GLUCOSE SERPL-MCNC: 135 MG/DL (ref 65–99)
HBA1C MFR BLD: 6.9 % (ref 4.8–5.6)
HCT VFR BLD AUTO: 46.1 % (ref 37.5–51)
HCV AB S/CO SERPL IA: 0.2 S/CO RATIO (ref 0–0.9)
HGB BLD-MCNC: 15.3 G/DL (ref 13–17.7)
MCH RBC QN AUTO: 28.6 PG (ref 26.6–33)
MCHC RBC AUTO-ENTMCNC: 33.2 G/DL (ref 31.5–35.7)
MCV RBC AUTO: 86 FL (ref 79–97)
MICROALBUMIN UR-MCNC: 10.1 UG/ML
PLATELET # BLD AUTO: 294 X10E3/UL (ref 150–450)
POTASSIUM SERPL-SCNC: 4.8 MMOL/L (ref 3.5–5.2)
PROT SERPL-MCNC: 7.7 G/DL (ref 6–8.5)
RBC # BLD AUTO: 5.35 X10E6/UL (ref 4.14–5.8)
SODIUM SERPL-SCNC: 141 MMOL/L (ref 134–144)
WBC # BLD AUTO: 5.7 X10E3/UL (ref 3.4–10.8)

## 2021-11-05 ENCOUNTER — TELEPHONE (OUTPATIENT)
Dept: FAMILY MEDICINE CLINIC | Age: 71
End: 2021-11-05

## 2021-11-05 NOTE — TELEPHONE ENCOUNTER
Called to review lab results. ID confirmed x 2. Overall, labs good. A1c higher at 6.9% but still at goal of < 7%. We discussed monitoring his diet to reduce sugar and carb intake. He admits to eating more pasta over the past months. Otherwise, labs good. Will mail copy of labs to him for his records.      Fransisca Capone MD

## 2022-01-12 DIAGNOSIS — E78.00 PURE HYPERCHOLESTEROLEMIA: ICD-10-CM

## 2022-01-12 DIAGNOSIS — Z78.9 TAKES DAILY MULTIVITAMINS: ICD-10-CM

## 2022-01-12 DIAGNOSIS — I25.10 CORONARY ARTERY DISEASE INVOLVING NATIVE HEART, UNSPECIFIED VESSEL OR LESION TYPE, UNSPECIFIED WHETHER ANGINA PRESENT: ICD-10-CM

## 2022-01-12 DIAGNOSIS — E11.9 CONTROLLED TYPE 2 DIABETES MELLITUS WITHOUT COMPLICATION, WITHOUT LONG-TERM CURRENT USE OF INSULIN (HCC): ICD-10-CM

## 2022-01-12 RX ORDER — ASPIRIN 325 MG
325 TABLET ORAL DAILY
Qty: 90 TABLET | Refills: 2 | Status: SHIPPED | OUTPATIENT
Start: 2022-01-12 | End: 2022-04-12 | Stop reason: SDUPTHER

## 2022-01-12 RX ORDER — ATORVASTATIN CALCIUM 40 MG/1
40 TABLET, FILM COATED ORAL DAILY
Qty: 90 TABLET | Refills: 2 | Status: SHIPPED | OUTPATIENT
Start: 2022-01-12 | End: 2022-09-29

## 2022-01-12 RX ORDER — METFORMIN HYDROCHLORIDE 1000 MG/1
1000 TABLET ORAL 2 TIMES DAILY WITH MEALS
Qty: 180 TABLET | Refills: 1 | Status: SHIPPED | OUTPATIENT
Start: 2022-01-12 | End: 2022-07-11

## 2022-01-12 RX ORDER — MULTIVIT-MIN/FA/LYCOPEN/LUTEIN .4-300-25
1 TABLET ORAL DAILY
Qty: 60 TABLET | Refills: 2 | Status: SHIPPED | OUTPATIENT
Start: 2022-01-12 | End: 2022-07-05

## 2022-03-18 PROBLEM — I10 ESSENTIAL HYPERTENSION: Status: ACTIVE | Noted: 2021-03-09

## 2022-03-18 PROBLEM — E78.00 PURE HYPERCHOLESTEROLEMIA: Status: ACTIVE | Noted: 2021-03-09

## 2022-03-18 PROBLEM — E11.9 CONTROLLED TYPE 2 DIABETES MELLITUS WITHOUT COMPLICATION, WITHOUT LONG-TERM CURRENT USE OF INSULIN (HCC): Status: ACTIVE | Noted: 2021-03-09

## 2022-03-18 PROBLEM — Z95.1 S/P CABG X 4: Status: ACTIVE | Noted: 2021-04-06

## 2022-03-18 PROBLEM — I25.10 CORONARY ARTERY DISEASE INVOLVING NATIVE HEART: Status: ACTIVE | Noted: 2021-04-06

## 2022-03-20 PROBLEM — F10.10 ETOH ABUSE: Status: ACTIVE | Noted: 2021-04-06

## 2022-03-20 PROBLEM — E55.9 VITAMIN D DEFICIENCY: Status: ACTIVE | Noted: 2021-03-09

## 2022-03-22 RX ORDER — FERROUS SULFATE TAB 325 MG (65 MG ELEMENTAL FE) 325 (65 FE) MG
TAB ORAL
Qty: 90 TABLET | Refills: 1 | Status: SHIPPED | OUTPATIENT
Start: 2022-03-22

## 2022-04-12 DIAGNOSIS — I25.10 CORONARY ARTERY DISEASE INVOLVING NATIVE HEART, UNSPECIFIED VESSEL OR LESION TYPE, UNSPECIFIED WHETHER ANGINA PRESENT: ICD-10-CM

## 2022-04-12 RX ORDER — ASPIRIN 325 MG
325 TABLET ORAL DAILY
Qty: 90 TABLET | Refills: 2 | Status: SHIPPED | OUTPATIENT
Start: 2022-04-12

## 2022-06-01 ENCOUNTER — APPOINTMENT (OUTPATIENT)
Dept: GENERAL RADIOLOGY | Age: 72
End: 2022-06-01
Attending: STUDENT IN AN ORGANIZED HEALTH CARE EDUCATION/TRAINING PROGRAM
Payer: MEDICARE

## 2022-06-01 ENCOUNTER — NURSE TRIAGE (OUTPATIENT)
Dept: OTHER | Facility: CLINIC | Age: 72
End: 2022-06-01

## 2022-06-01 ENCOUNTER — HOSPITAL ENCOUNTER (EMERGENCY)
Age: 72
Discharge: HOME | End: 2022-06-01
Attending: STUDENT IN AN ORGANIZED HEALTH CARE EDUCATION/TRAINING PROGRAM
Payer: MEDICARE

## 2022-06-01 VITALS
OXYGEN SATURATION: 95 % | HEART RATE: 64 BPM | SYSTOLIC BLOOD PRESSURE: 140 MMHG | WEIGHT: 180 LBS | RESPIRATION RATE: 14 BRPM | TEMPERATURE: 99 F | HEIGHT: 69 IN | DIASTOLIC BLOOD PRESSURE: 74 MMHG | BODY MASS INDEX: 26.66 KG/M2

## 2022-06-01 DIAGNOSIS — S61.402A OPEN WOUND OF LEFT HAND, FOREIGN BODY PRESENCE UNSPECIFIED, UNSPECIFIED WOUND TYPE, INITIAL ENCOUNTER: Primary | ICD-10-CM

## 2022-06-01 PROCEDURE — 90715 TDAP VACCINE 7 YRS/> IM: CPT | Performed by: STUDENT IN AN ORGANIZED HEALTH CARE EDUCATION/TRAINING PROGRAM

## 2022-06-01 PROCEDURE — 96365 THER/PROPH/DIAG IV INF INIT: CPT

## 2022-06-01 PROCEDURE — 74011000250 HC RX REV CODE- 250: Performed by: STUDENT IN AN ORGANIZED HEALTH CARE EDUCATION/TRAINING PROGRAM

## 2022-06-01 PROCEDURE — 90471 IMMUNIZATION ADMIN: CPT

## 2022-06-01 PROCEDURE — 73130 X-RAY EXAM OF HAND: CPT

## 2022-06-01 PROCEDURE — 74011250636 HC RX REV CODE- 250/636: Performed by: STUDENT IN AN ORGANIZED HEALTH CARE EDUCATION/TRAINING PROGRAM

## 2022-06-01 PROCEDURE — 99284 EMERGENCY DEPT VISIT MOD MDM: CPT

## 2022-06-01 RX ORDER — CLINDAMYCIN HYDROCHLORIDE 150 MG/1
450 CAPSULE ORAL 3 TIMES DAILY
Qty: 63 CAPSULE | Refills: 0 | Status: SHIPPED | OUTPATIENT
Start: 2022-06-01 | End: 2022-06-08

## 2022-06-01 RX ORDER — BACITRACIN 500 UNIT/G
1 PACKET (EA) TOPICAL
Status: COMPLETED | OUTPATIENT
Start: 2022-06-01 | End: 2022-06-01

## 2022-06-01 RX ADMIN — CEFAZOLIN SODIUM 2 G: 1 POWDER, FOR SOLUTION INTRAMUSCULAR; INTRAVENOUS at 14:20

## 2022-06-01 RX ADMIN — BACITRACIN 1 PACKET: 500 OINTMENT TOPICAL at 14:51

## 2022-06-01 RX ADMIN — TETANUS TOXOID, REDUCED DIPHTHERIA TOXOID AND ACELLULAR PERTUSSIS VACCINE, ADSORBED 0.5 ML: 5; 2.5; 8; 8; 2.5 SUSPENSION INTRAMUSCULAR at 13:46

## 2022-06-01 NOTE — ED NOTES
Patient tolerated iv antibiotics well. Removed IV lock. Cleaned and dressed left hand thumb wound with saline and bacitracin and placed a tube gauze dressing over that. [facility]. The patient is alert and oriented, in no respiratory distress and discharge vital signs obtained. The patient's diagnosis, condition and treatment were explained. The patient expressed understanding. 1 prescriptions given. A discharge plan has been developed. A  was not involved in the process. Aftercare instructions were given. Pt ambulatory out of the ED.

## 2022-06-01 NOTE — TELEPHONE ENCOUNTER
Received call from [facility]  at [facility] with Red Flag Complaint. Subjective: Caller states \" On Sunday he cut his thumb. We wrapped it up. We unwrapped it and it started bleeding again today. Current Symptoms:   + not sure if it is red or swollen   +\" I have not taken the bandage off today \"   +cut on a table saw   +tip of the thumb - and inch down   -unsure of last tetanus shot   -denies numbness or tingling   +every time I take the bandage off it bleeds -\"tape and gauze\"     Onset: 4 days ago; worsening    Associated Symptoms: NA    Pain Severity: Denies   +\"hurts when I pick something up\"     Temperature: Denies     What has been tried: bandages and tape     Recommended disposition: Go to ED/UCC Now (Or to Office with PCP Approval)    Care advice provided, patient verbalizes understanding; denies any other questions or concerns; instructed to call back for any new or worsening symptoms. Patient/caller agrees to proceed to local  Emergency Department     Discussed the risk of infection and risks of prolonging emergency medical care     Attention Provider: Thank you for allowing me to participate in the care of your patient. The patient was connected to triage in response to information provided to the [facility]. Please do not respond through this encounter as the response is not directed to a shared pool. Reason for Disposition   Skin is split open or gaping (length > 1/2 inch or 12 mm)    Protocols used:  FINGER INJURY-ADULT-[place]

## 2022-06-01 NOTE — ED TRIAGE NOTES
\"I cut my left hand thumb on a table saw at 9 pm on Sunday night. I cleaned it with peroxide and wrapped it up. On Monday I did the same thing, but It kept bleeding, so I wrapped it up again and have not rechecked it since then. \" No pain and No active bleeding. Arrived with his own gauze wrap dressing from home.

## 2022-06-01 NOTE — ED PROVIDER NOTES
HPI     Patient is a 54-year-old male who presents today with left thumb injury. Patient cut his left on a table saw on Sunday night. He cleaned it with hydroperoxide and wrapped it up. On Monday, he cleaned his thumb again. He presented to the ED for further evaluation. He denies any pain. He has had no fevers. He is right-hand dominant. Past Medical History:   Diagnosis Date    Diabetes ([address]     Hypercholesterolemia     Hypertension        Past Surgical History:   Procedure Laterality Date    HX TONSILLECTOMY           Family History:   Problem Relation Age of Onset    Heart Attack Mother     Cancer Father        Social History     Socioeconomic History    Marital status:      Spouse name: Not on file    Number of children: Not on file    Years of education: Not on file    Highest education level: Not on file   Occupational History    Not on file   Tobacco Use    Smoking status: Never Smoker    Smokeless tobacco: Never Used   Substance and Sexual Activity    Alcohol use: Yes     Comment: AT NIGHT AT HOME    Drug use: No    Sexual activity: Yes     Partners: Female   Other Topics Concern    Not on file   Social History Narrative    Not on file     Social Determinants of Health     Financial Resource Strain:     Difficulty of Paying Living Expenses: Not on file   Food Insecurity:     Worried About Running Out of Food in the Last Year: Not on file    Paulino of Food in the Last Year: Not on file   Transportation Needs:     Lack of Transportation (Medical): Not on file    Lack of Transportation (Non-Medical):  Not on file   Physical Activity:     Days of Exercise per Week: Not on file    Minutes of Exercise per Session: Not on file   Stress:     Feeling of Stress : Not on file   Social Connections:     Frequency of Communication with Friends and Family: Not on file    Frequency of Social Gatherings with Friends and Family: Not on file    Attends Spiritism Services: Not on file   [address] [facility] HomeWellness or Organizations: Not on file    Attends Club or Organization Meetings: Not on file    Marital Status: Not on file   Intimate Partner Violence:     Fear of Current or Ex-Partner: Not on file    Emotionally Abused: Not on file    Physically Abused: Not on file    Sexually Abused: Not on file   Housing Stability:     Unable to Pay for Housing in the Last Year: Not on file    Number of [place] in the Last Year: Not on file    Unstable Housing in the Last Year: Not on file         ALLERGIES: Amoxicillin    Review of Systems   Constitutional: Negative for appetite change and fever. HENT: Negative for congestion and rhinorrhea. Eyes: Negative for discharge and redness. Respiratory: Negative for cough and shortness of breath. Cardiovascular: Negative for chest pain. Gastrointestinal: Negative for abdominal pain, diarrhea, nausea and vomiting. Genitourinary: Negative for decreased urine volume and dysuria. Musculoskeletal: Negative for back pain. Skin: Positive for wound. Negative for rash. Neurological: Negative for seizures and headaches. Hematological: Does not bruise/bleed easily. Psychiatric/Behavioral: Negative for agitation. All other systems reviewed and are negative. Vitals:    06/01/22 1318 06/01/22 1501   BP: (!) 164/83 (!) 140/74   Pulse: 76 64   Resp: 14 14   Temp: 99 °F (37.2 °C)    SpO2: 97% 95%   Weight: 81.6 kg (180 lb)    Height: 5' 9\" (1.753 m)             Physical Exam  Vitals and nursing note reviewed. Constitutional:       General: He is not in acute distress. Appearance: Normal appearance. HENT:      Head: Normocephalic and atraumatic. Nose: Nose normal.      Mouth/Throat:      Mouth: Mucous membranes are moist.      Pharynx: Oropharynx is clear. Eyes:      Extraocular Movements: Extraocular movements intact.       Conjunctiva/sclera: Conjunctivae normal.      Pupils: Pupils are equal, round, and reactive to light. Cardiovascular:      Rate and Rhythm: Normal rate and regular rhythm. Pulses: Normal pulses. Heart sounds: Normal heart sounds. No murmur heard. No friction rub. No gallop. Pulmonary:      Effort: Pulmonary effort is normal.      Breath sounds: Normal breath sounds. Abdominal:      General: Bowel sounds are normal. There is no distension. Palpations: Abdomen is soft. Tenderness: There is no abdominal tenderness. Musculoskeletal:         General: Normal range of motion. Hands:       Cervical back: Normal range of motion. Skin:     General: Skin is warm and dry. Capillary Refill: Capillary refill takes less than 2 seconds. Neurological:      General: No focal deficit present. Mental Status: He is alert and oriented to person, place, and time. Mental status is at baseline. Psychiatric:         Mood and Affect: Mood normal.          MDM        Patient is a 66-year-old male who presents today for left thumb injury after a saw injury that occurs 4 days ago. He has been using hydrogen peroxide and home wound care. On exam, the left thumb has a deep laceration and tissue loss to the left thumb. Patient has no pain. Neurovascularly intact. Pictures were sent on perfect serve to  Orthopedics. I spoke with DEIDRA Samaniego with orthopedics, who recommends IV ancef and oral antibiotics with follow up with Dr. Leah Hubbard tomorrow morning. Tetanus was updated. Due to allergy to amoxicillin, patient was sent home with clindamycin. Per discussion with orthopedics ,they recommend telfa or nonstick dressing. Xray negative for fracture. All available radiology and laboratory results have been reviewed with patient and/or available family.   Patient and/or family verbally conveyed their understanding and agreement of the patient's signs, symptoms, diagnosis, treatment and prognosis and additionally agree to follow-up as recommended in the discharge instructions or to return to the Emergency Department should their condition change or worsen prior to their follow-up appointment. All questions have been answered and patient and/or available family express understanding. LABORATORY RESULTS:  Labs Reviewed - No data to display    IMAGING RESULTS:  XR HAND LT MIN 3 V   Final Result      First digit soft tissue laceration. No radiodense foreign body or underlying   fracture. Multifocal osteoarthritis. MEDICATIONS GIVEN:  Medications   diph,Pertuss(AC),Tet Vac-PF (BOOSTRIX) suspension 0.5 mL (0.5 mL IntraMUSCular Given 6/1/22 1346)   ceFAZolin (ANCEF) 2 g in sterile water (preservative free) 20 mL IV syringe (0 g IntraVENous IV Completed 6/1/22 1502)   bacitracin 500 unit/gram packet 1 Packet (1 Packet Topical Given 6/1/22 6246)       IMPRESSION:  1. Open wound of left hand, foreign body presence unspecified, unspecified wound type, initial encounter        PLAN:  Follow-up Information     Follow up With Specialties Details Why Contact Info    Rhonda Adams MD Orthopedic Surgery Schedule an appointment as soon as possible for a visit  9:00 AM [address] [place] [address]  [phone]      [address] [place] [address] DEPT Emergency Medicine Go to  If symptoms worsen [facility] [address] [id number]  [phone]         Discharge Medication List as of 6/1/2022  1:50 PM      START taking these medications    Details   clindamycin (CLEOCIN) 150 mg capsule Take 3 Capsules by mouth three (3) times daily for 7 days. , Print, Disp-63 Capsule, R-0         CONTINUE these medications which have NOT CHANGED    Details   aspirin (ASPIRIN) 325 mg tablet Take 1 Tablet by mouth daily. , Normal, Disp-90 Tablet, R-2      FeroSuL 325 mg (65 mg iron) tablet TAKE ONE TABLET BY MOUTH EVERY MORNING BEFORE BREAKFAST, Normal, Disp-90 Tablet, R-1      atorvastatin (LIPITOR) 40 mg tablet Take 1 Tablet by mouth daily. , Normal, Disp-90 Tablet, R-2 multivit-min-FA-lycopen-lutein (Centrum Silver) 0.4-300-250 mg-mcg-mcg tab Take 1 Tablet by mouth daily. , Normal, Disp-60 Tablet, R-2      metFORMIN (GLUCOPHAGE) 1,000 mg tablet Take 1 Tablet by mouth two (2) times daily (with meals) for 180 days. , Normal, Disp-180 Tablet, R-1      losartan (COZAAR) 25 mg tablet Historical Med      metoprolol succinate (TOPROL-XL) 25 mg XL tablet Take 12.5 mg by mouth daily. , Historical Med      glucose blood VI test strips (FreeStyle Lite Strips) strip Use to check blood sugars once daily, Normal, Disp-100 Strip, R-3Dx code: E11.9      escitalopram oxalate (LEXAPRO) 5 mg tablet Historical Med      fluticasone (FLONASE) 50 mcg/actuation nasal spray 2 Sprays by Both Nostrils route daily. , Normal, Disp-1 Bottle, R-0               Jong Farnsworth MD        Please note that this dictation was completed with Chujian, the [employer] voice recognition software. Quite often unanticipated grammatical, syntax, homophones, and other interpretive errors are inadvertently transcribed by the computer software. Please disregard these errors. Please excuse any errors that have escaped final proofreading.            Procedures

## 2022-06-02 ENCOUNTER — TELEPHONE (OUTPATIENT)
Dept: FAMILY MEDICINE CLINIC | Age: 72
End: 2022-06-02

## 2022-06-02 DIAGNOSIS — S61.012D LACERATION OF LEFT THUMB, FOREIGN BODY PRESENCE UNSPECIFIED, NAIL DAMAGE STATUS UNSPECIFIED, SUBSEQUENT ENCOUNTER: Primary | ICD-10-CM

## 2022-06-02 NOTE — TELEPHONE ENCOUNTER
Patient wife call and said Rosa Cash is at [employer] needing a referral to be seen today. The doctor name Rosa Cash is seeing is Scott Mckeon. I sent over all the information to Mrs. Aftab Woo as well to start the process with his insurance company.

## 2022-06-02 NOTE — TELEPHONE ENCOUNTER
Pt's wife called. Needs Ortho referral placed - no provider name given. Evaluated in ED on 6/1/22 for thumb injury. Order placed.

## 2022-09-29 DIAGNOSIS — I25.10 CORONARY ARTERY DISEASE INVOLVING NATIVE HEART, UNSPECIFIED VESSEL OR LESION TYPE, UNSPECIFIED WHETHER ANGINA PRESENT: ICD-10-CM

## 2022-09-29 DIAGNOSIS — E78.00 PURE HYPERCHOLESTEROLEMIA: ICD-10-CM

## 2022-09-29 RX ORDER — ATORVASTATIN CALCIUM 40 MG/1
TABLET, FILM COATED ORAL
Qty: 90 TABLET | Refills: 1 | Status: SHIPPED | OUTPATIENT
Start: 2022-09-29

## 2022-10-07 ENCOUNTER — TELEPHONE (OUTPATIENT)
Dept: FAMILY MEDICINE CLINIC | Age: 72
End: 2022-10-07

## 2022-10-07 NOTE — TELEPHONE ENCOUNTER
Contacted patient to schedule diabetes follow up with Gneesis Kinney MD. Patient would like an appointment with Dr. Eddie Doll but schedule is currently blocked. Will reach out to practice.

## 2022-11-22 ENCOUNTER — OFFICE VISIT (OUTPATIENT)
Dept: FAMILY MEDICINE CLINIC | Age: 72
End: 2022-11-22
Payer: MEDICARE

## 2022-11-22 VITALS
OXYGEN SATURATION: 99 % | HEIGHT: 69 IN | HEART RATE: 73 BPM | WEIGHT: 187 LBS | DIASTOLIC BLOOD PRESSURE: 77 MMHG | TEMPERATURE: 98.3 F | BODY MASS INDEX: 27.7 KG/M2 | RESPIRATION RATE: 17 BRPM | SYSTOLIC BLOOD PRESSURE: 138 MMHG

## 2022-11-22 DIAGNOSIS — E11.9 TYPE 2 DIABETES MELLITUS WITHOUT COMPLICATION, WITHOUT LONG-TERM CURRENT USE OF INSULIN (HCC): ICD-10-CM

## 2022-11-22 DIAGNOSIS — I10 ESSENTIAL HYPERTENSION: ICD-10-CM

## 2022-11-22 DIAGNOSIS — Z95.1 S/P CABG X 4: ICD-10-CM

## 2022-11-22 DIAGNOSIS — Z71.89 ADVANCE CARE PLANNING: ICD-10-CM

## 2022-11-22 DIAGNOSIS — Z12.11 COLON CANCER SCREENING: ICD-10-CM

## 2022-11-22 DIAGNOSIS — Z00.00 MEDICARE ANNUAL WELLNESS VISIT, SUBSEQUENT: ICD-10-CM

## 2022-11-22 DIAGNOSIS — E78.00 PURE HYPERCHOLESTEROLEMIA: Primary | ICD-10-CM

## 2022-11-22 PROCEDURE — G8536 NO DOC ELDER MAL SCRN: HCPCS | Performed by: STUDENT IN AN ORGANIZED HEALTH CARE EDUCATION/TRAINING PROGRAM

## 2022-11-22 PROCEDURE — 3074F SYST BP LT 130 MM HG: CPT | Performed by: STUDENT IN AN ORGANIZED HEALTH CARE EDUCATION/TRAINING PROGRAM

## 2022-11-22 PROCEDURE — G8754 DIAS BP LESS 90: HCPCS | Performed by: STUDENT IN AN ORGANIZED HEALTH CARE EDUCATION/TRAINING PROGRAM

## 2022-11-22 PROCEDURE — 3017F COLORECTAL CA SCREEN DOC REV: CPT | Performed by: STUDENT IN AN ORGANIZED HEALTH CARE EDUCATION/TRAINING PROGRAM

## 2022-11-22 PROCEDURE — G0439 PPPS, SUBSEQ VISIT: HCPCS | Performed by: STUDENT IN AN ORGANIZED HEALTH CARE EDUCATION/TRAINING PROGRAM

## 2022-11-22 PROCEDURE — 1123F ACP DISCUSS/DSCN MKR DOCD: CPT | Performed by: STUDENT IN AN ORGANIZED HEALTH CARE EDUCATION/TRAINING PROGRAM

## 2022-11-22 PROCEDURE — G8417 CALC BMI ABV UP PARAM F/U: HCPCS | Performed by: STUDENT IN AN ORGANIZED HEALTH CARE EDUCATION/TRAINING PROGRAM

## 2022-11-22 PROCEDURE — G8427 DOCREV CUR MEDS BY ELIG CLIN: HCPCS | Performed by: STUDENT IN AN ORGANIZED HEALTH CARE EDUCATION/TRAINING PROGRAM

## 2022-11-22 PROCEDURE — 1101F PT FALLS ASSESS-DOCD LE1/YR: CPT | Performed by: STUDENT IN AN ORGANIZED HEALTH CARE EDUCATION/TRAINING PROGRAM

## 2022-11-22 PROCEDURE — 3046F HEMOGLOBIN A1C LEVEL >9.0%: CPT | Performed by: STUDENT IN AN ORGANIZED HEALTH CARE EDUCATION/TRAINING PROGRAM

## 2022-11-22 PROCEDURE — 2022F DILAT RTA XM EVC RTNOPTHY: CPT | Performed by: STUDENT IN AN ORGANIZED HEALTH CARE EDUCATION/TRAINING PROGRAM

## 2022-11-22 PROCEDURE — G8510 SCR DEP NEG, NO PLAN REQD: HCPCS | Performed by: STUDENT IN AN ORGANIZED HEALTH CARE EDUCATION/TRAINING PROGRAM

## 2022-11-22 PROCEDURE — G8752 SYS BP LESS 140: HCPCS | Performed by: STUDENT IN AN ORGANIZED HEALTH CARE EDUCATION/TRAINING PROGRAM

## 2022-11-22 PROCEDURE — 3078F DIAST BP <80 MM HG: CPT | Performed by: STUDENT IN AN ORGANIZED HEALTH CARE EDUCATION/TRAINING PROGRAM

## 2022-11-22 NOTE — PROGRESS NOTES
Medicare Annual Wellness Visit    Yuriy Vo is a 67 y.o. male    Chief Complaint   Patient presents with    Annual Wellness Visit       1. Have you been to the ER, urgent care clinic since your last visit? Hospitalized since your last visit? {Yes when where and reason for visit:20441}  2. Have you seen or consulted any other health care providers outside of the [address] [place] [address] since your last visit? Include any pap smears or colon screening. {Yes when where and reason for visit:20441}    Visit Vitals  /77 (BP 1 Location: Left arm, BP Patient Position: Sitting, BP Cuff Size: Adult)   Pulse 73   Temp 98.3 °F (36.8 °C) (Oral)   Resp 17   Ht 5' 9\" (1.753 m)   Wt 187 lb (84.8 kg)   SpO2 99%   BMI 27.62 kg/m²         General Health Questions   -During the past 4 weeks:   -How would you rate your health in general? {Good/Fair/Poor:54593::\"Good\"}   -How often have you been bothered by feeling dizzy when standing up? {Fq:96986}  -How much have you been bothered by bodily pain? {Not/mildly/moderately/severely exam md:81954}  -Has your physical and emotional health limited your social activities with family or friends? {yes/ no default no:19426}    Emotional Health Questions   -Do you have a history of depression, anxiety, or emotional problems? {yes/ no default no:19426}  -Over the past 2 weeks, have you felt down, depressed or hopeless? {yes/ no default no:19426}  -Over the past 2 weeks, have you felt little interest or pleasure in doing things? {yes/ no default no:19426}    Depression Risk Factor Screening     3 most recent PHQ Screens 11/22/2022   Little interest or pleasure in doing things Not at all   Feeling down, depressed, irritable, or hopeless Not at all   Total Score PHQ 2 0       Health Habits   -Please describe your diet habits: ***  -Do you get 5 servings of fruits or vegetables daily? {yes/ no default yes:19425}  -Do you exercise regularly?  {yes/ no default yes:19425}    Alcohol & Drug Abuse Risk Screen    Do you average more than 1 drink per night or more than 7 drinks a week: {Yes/No:140031}    In the past three months have you have had more than 4 drinks containing alcohol on one occasion: {Yes/No:140031}            Activities of Daily Living    -Do you need help with eating, walking, dressing, bathing, toileting, the phone, transportation, shopping, preparing meals, housework, laundry, medications or managing money? {yes/ no default no:19426}  -In the past four weeks, was someone available to help you if you needed and wanted help with anything? {yes/ no default yes:19425}  -Are you confident are you that you can control and manage most of your health problems? {yes/ no default yes:19425}  -Have you been given information to help you keep track of your medications? {yes/ no default yes:19425}  -How often do you have trouble taking your medications as prescribed? {Fq:11428}  {Functional ADL's:17878::\"Patient does total self care\"}   Hearing: {Desc; hearing loss:00993::\"Hearing is good. \"}         Fall Risk and Home Safety   -Have you fallen 2 or more times in the past year? {yes/ no default no:19426}  -Does your home have rugs in the hallways? {yes/ no default no:19426},   -Do you have grab bars in the bathrooms? {yes/ no default no:19426},   -Does your home have handrails on the stairs? {yes/ no default no:19426},   -Do you have adequate lighting in your home? {yes/ no default no:19426}  -Do you have smoke detectors and check them regularly? {yes/ no default yes:19425}  -Do you have difficulties driving a car/vehicle? {yes/ no default no:19426}  -Do you always fasten your seat belt when you are in a car? {yes/ no default yes:19425}  -The home contains: {AWV Home EJZWOD:53936::\"NS safety equipment. \"}  Fall Risk:  Fall Risk Assessment, last 12 mths 11/22/2022   Able to walk? Yes   Fall in past 12 months? 0   Do you feel unsteady?  0   Are you worried about falling 0   Is the gait abnormal? - Number of falls in past 12 months -       Abuse Screen:  {Abuse Screen:19752::\"Patient is not abused\"}    Cognitive Screening    Has your family/caregiver stated any concerns about your memory: {AWV no/yes:23362::\"no\"}      Mini Cog Score (65+): ***    Health Maintenance Due     Health Maintenance Due   Topic Date Due    Eye Exam Retinal or Dilated  Never done    Colorectal Cancer Screening Combo  Never done    Shingrix Vaccine Age 50> (1 of 2) Never done    Lipid Screen  04/06/2022    Foot Exam Q1  11/03/2022    A1C test (Diabetic or Prediabetic)  11/03/2022    MICROALBUMIN Q1  11/03/2022    Depression Screen  11/03/2022       Patient Care Team   Patient Care Team:  Sunil Ramirez MD as PCP - General (Family Medicine)  Sunil Ramirez MD as PCP - [facility]  Rehan Solorzano RN as Ambulatory Care Manager (Family Medicine)    History     Patient Active Problem List   Diagnosis Code    Essential hypertension I10    Pure hypercholesterolemia E78.00    Controlled type 2 diabetes mellitus without complication, without long-term current use of insulin ([facility] [address] E11.9    Vitamin D deficiency E55.9    ETOH abuse F10.10    Coronary artery disease involving native heart I25.10    S/P CABG x 4 Z95.1     Past Medical History:   Diagnosis Date    Diabetes ([facility] [address]     Hypercholesterolemia     Hypertension       Past Surgical History:   Procedure Laterality Date    HX TONSILLECTOMY       Current Outpatient Medications   Medication Sig Dispense Refill    atorvastatin (LIPITOR) 40 mg tablet TAKE ONE TABLET BY MOUTH ONE TIME DAILY 90 Tablet 1    Centrum Silver 0.4 mg-300 mcg- 250 mcg tab tablet TAKE ONE TABLET BY MOUTH ONE TIME DAILY 90 Tablet 1    aspirin (ASPIRIN) 325 mg tablet Take 1 Tablet by mouth daily.  90 Tablet 2    FeroSuL 325 mg (65 mg iron) tablet TAKE ONE TABLET BY MOUTH EVERY MORNING BEFORE BREAKFAST 90 Tablet 1    losartan (COZAAR) 25 mg tablet       metoprolol succinate (TOPROL-XL) 25 mg XL tablet Take 12.5 mg by mouth daily.       glucose blood VI test strips (FreeStyle Lite Strips) strip Use to check blood sugars once daily 100 Strip 3    escitalopram oxalate (LEXAPRO) 5 mg tablet        Allergies   Allergen Reactions    Amoxicillin Hives       Family History   Problem Relation Age of Onset    Heart Attack Mother     Cancer Father      Social History     Tobacco Use    Smoking status: Never    Smokeless tobacco: Never   Substance Use Topics    Alcohol use: Yes     Comment: AT NIGHT  Service [address] Maintenance Due   Topic Date Due    Eye Exam Retinal or Dilated  Never done    Colorectal Cancer Screening Combo  Never done    Shingrix Vaccine Age 50> (1 of 2) Never done    Lipid Screen  04/06/2022    Foot Exam Q1  11/03/2022    A1C test (Diabetic or Prediabetic)  11/03/2022    MICROALBUMIN Q1  11/03/2022    Depression Screen  11/03/2022         Andree Carvalho

## 2022-11-22 NOTE — PATIENT INSTRUCTIONS
Medicare Wellness Visit, Male    The best way to live healthy is to have a lifestyle where you eat a well-balanced diet, exercise regularly, limit alcohol use, and quit all forms of tobacco/nicotine, if applicable. Regular preventive services are another way to keep healthy. Preventive services (vaccines, screening tests, monitoring & exams) can help personalize your care plan, which helps you manage your own care. Screening tests can find health problems at the earliest stages, when they are easiest to treat. [facility] follows the current, evidence-based guidelines published by the [facility] ([facility]) when recommending preventive services for our patients. Because we follow these guidelines, sometimes recommendations change over time as research supports it. (For example, a prostate screening blood test is no longer routinely recommended for men with no symptoms). Of course, you and your doctor may decide to screen more often for some diseases, based on your risk and co-morbidities (chronic disease you are already diagnosed with). Preventive services for you include:  - Medicare offers their members a free annual wellness visit, which is time for you and your primary care provider to discuss and plan for your preventive service needs. Take advantage of this benefit every year!  -All adults over age 72 should receive the recommended pneumonia vaccines. Current USPSTF guidelines recommend a series of two vaccines for the best pneumonia protection.   -All adults should have a flu vaccine yearly and tetanus vaccine every 10 years.  -All adults age 48 and older should receive the shingles vaccines (series of two vaccines).        -All adults age 38-68 who are overweight should have a diabetes screening test once every three years.   -Other screening tests & preventive services for persons with diabetes include: an eye exam to screen for diabetic retinopathy, a kidney function test, a foot exam, and stricter control over your cholesterol.   -Cardiovascular screening for adults with routine risk involves an electrocardiogram (ECG) at intervals determined by the provider.   -Colorectal cancer screening should be done for adults age 54-65 with no increased risk factors for colorectal cancer. There are a number of acceptable methods of screening for this type of cancer. Each test has its own benefits and drawbacks. Discuss with your provider what is most appropriate for you during your annual wellness visit. The different tests include: colonoscopy (considered the best screening method), a fecal occult blood test, a fecal DNA test, and sigmoidoscopy.  -All adults born between [facility] should be screened once for Hepatitis C.  -An Abdominal Aortic Aneurysm (AAA) Screening is recommended for men age 73-68 who has ever smoked in their lifetime.      Here is a list of your current Health Maintenance items (your personalized list of preventive services) with a due date:  Health Maintenance Due   Topic Date Due    Eye Exam  Never done    Colorectal Screening  Never done    Shingles Vaccine (1 of 2) Never done    Cholesterol Test   04/06/2022    Diabetic Foot Care  11/03/2022    Hemoglobin A1C    11/03/2022    Albumin Urine Test  11/03/2022    Depresssion Screening  11/03/2022

## 2022-11-22 NOTE — PROGRESS NOTES
[id number] [facility]  [address] [facility] [address] [place] [address] Maury Taylor   [phone]            Date of visit: 11/22/2022     This is a Subsequent Medicare Annual Wellness Visit (AWV), (Performed more than 12 months after effective date of Medicare Part B enrollment and 12 months after last preventive visit, Once in a lifetime)    I have reviewed the patient's medical history in detail and updated the computerized patient record. Sabrina Rangel is a 67 y.o. male   History obtained from: the patient. Concerns today   (Patient understands that medical problems addressed today may incur additional cost as this is a preventive visit)    #Frequent urination  - no dysuria, fever, trouble initiating streams  - drinks water and artificially sweetened tea  - eating out a little more  - blood sugar has been a little higher in the mornings than usual  - last A1c 6.9%    History     Patient Active Problem List   Diagnosis Code    Essential hypertension I10    Pure hypercholesterolemia E78.00    Controlled type 2 diabetes mellitus without complication, without long-term current use of insulin ([facility]) E11.9    Vitamin D deficiency E55.9    ETOH abuse F10.10    Coronary artery disease involving native heart I25.10    S/P CABG x 4 Z95.1     Past Medical History:   Diagnosis Date    Diabetes ([facility] [address]     Hypercholesterolemia     Hypertension       Past Surgical History:   Procedure Laterality Date    HX TONSILLECTOMY       Allergies   Allergen Reactions    Amoxicillin Hives     Current Outpatient Medications   Medication Sig Dispense Refill    atorvastatin (LIPITOR) 40 mg tablet TAKE ONE TABLET BY MOUTH ONE TIME DAILY 90 Tablet 1    Centrum Silver 0.4 mg-300 mcg- 250 mcg tab tablet TAKE ONE TABLET BY MOUTH ONE TIME DAILY 90 Tablet 1    aspirin (ASPIRIN) 325 mg tablet Take 1 Tablet by mouth daily.  90 Tablet 2    FeroSuL 325 mg (65 mg iron) tablet TAKE ONE TABLET BY MOUTH EVERY MORNING BEFORE BREAKFAST 90 Tablet 1 losartan (COZAAR) 25 mg tablet       metoprolol succinate (TOPROL-XL) 25 mg XL tablet Take 12.5 mg by mouth daily. glucose blood VI test strips (FreeStyle Lite Strips) strip Use to check blood sugars once daily 100 Strip 3    escitalopram oxalate (LEXAPRO) 5 mg tablet        Family History   Problem Relation Age of Onset    Heart Attack Mother     Cancer Father      Social History     Tobacco Use    Smoking status: Never    Smokeless tobacco: Never   Substance Use Topics    Alcohol use: Yes     Comment: AT NIGHT AT HOME       Specialists/Care Team   Kehinde Willis has established care with the following healthcare providers:  Patient Care Team:  Dewayne Pearl MD as PCP - General (Family Medicine)  Dewayne Pearl MD as PCP - [facility] Provider  Margarita Cheng RN as Ambulatory Care Manager (Family Medicine)      Health Risk Assessment     Demographics   male  67 y.o. General Health Questions   -During the past 4 weeks:   -how would you rate your health in general? Good   -how often have you been bothered by feeling dizzy when standing up? never   -how much have you been bothered by bodily pain? mildly   -Have you noticed any hearing difficulties? no   -has your physical and emotional health limited your social activities with family or friends? no    Emotional Health Questions   -Do you have a history of depression, anxiety, or emotional problems? yes  -Over the past 2 weeks, have you felt down, depressed or hopeless? no  -Over the past 2 weeks, have you felt little interest or pleasure in doing things? no    Health Habits   Please describe your diet habits: leftovers   Do you get 5 servings of fruits or vegetables daily? yes  Do you exercise regularly? yes    Activities of Daily Living and Functional Status   -Do you need help with eating, walking, dressing, bathing, toileting, the phone, transportation, shopping, preparing meals, housework, laundry, medications or managing money? no  -In the past four weeks, was someone available to help you if you needed and wanted help with anything? yes  -Are you confident are you that you can control and manage most of your health problems? yes  -Have you been given information to help you keep track of your medications? yes  -How often do you have trouble taking your medications as prescribed? never    Fall Risk and Home Safety   Have you fallen 2 or more times in the past year? no  Does your home have rugs in the hallway, lack grab bars in the bathroom, lack handrails on the stairs or have poor lighting? no  Do you have smoke detectors and check them regularly? yes  Do you have difficulties driving a car? no  Do you always fasten your seat belt when you are in a car? yes    Review of Systems (if indicated for problems addressed today)   See HPI    Physical Examination     Vitals:    11/22/22 1348   BP: 138/77   Pulse: 73   Resp: 17   Temp: 98.3 °F (36.8 °C)   TempSrc: Oral   SpO2: 99%   Weight: 187 lb (84.8 kg)   Height: 5' 9\" (1.753 m)     Body mass index is 27.62 kg/m². No results found. Was the patient's timed Up & Go test unsteady or longer than 30 seconds? no    Evaluation of Cognitive Function   Mood/affect:  neutral  Orientation: Person, Place, Time, and Situation  Appearance: age appropriate  Family member/caregiver input: none    Additional exam if indicated for problems addressed today:  General: No acute distress. HEENT: Conjunctiva are clear, sclera non-icteric. Respiratory: Normal work of breathing, talking in full sentences without issue  Musculoskeletal: Normal gait.    Skin: No abnormalities or rashes   Neuro: Alert, oriented, speech clear and coherent  Psychiatric: Normal mood and affect      Advice/Referrals/Counseling (as indicated)   Education and counseling provided for any problems identified above: Colon cancer screen    Cognitive Screen: three words on recall; Clock done correctly    Preventive Services     (Preventive care checklist to be included in patient instructions)  Discussed today Done Previously Not Needed     x  Pneumococcal vaccines    x  Flu vaccine     x Hepatitis B vaccine (if at risk)   X- pt declines   Shingles vaccine    x  TDAP vaccine     x Digital rectal exam     x PSA   X-pt declines   Colorectal cancer screening     x Low-dose CT for lung cancer screening     x Bone density test   X - has appt   Glaucoma screening   x   Cholesterol test   x x  Diabetes screening test      x Diabetes self-management class     x Nutritionist referral for diabetes or renal disease     Discussion of Advance Directive   Discussed with Roberto Damon his ability to prepare and advance directive in the case that an injury or illness causes him to be unable to make health care decisions. Refferal to ACP, pt states he would be DNR but wants to discuss with wife first    Assessment/Plan   Z00.00    ICD-10-CM ICD-9-CM    1. Pure hypercholesterolemia  E78.00 272.0 LIPID PANEL      LIPID PANEL      2. Essential hypertension  P56 564.1 METABOLIC PANEL, BASIC      METABOLIC PANEL, BASIC      3. S/P CABG x 4  Z95.1 V45.81       4. Type 2 diabetes mellitus without complication, without long-term current use of insulin (HCC)  E11.9 250.00 HEMOGLOBIN A1C WITH EAG      METABOLIC PANEL, BASIC      MICROALBUMIN, UR, RAND W/ MICROALB/CREAT RATIO      METABOLIC PANEL, BASIC      HEMOGLOBIN A1C WITH EAG      MICROALBUMIN, UR, RAND W/ MICROALB/CREAT RATIO      5. Advance care planning  Z71.89 V65.49 REFERRAL TO ACP CLINICAL SPECIALIST      6. Colon cancer screening  Z12.11 V76.51 COLOGUARD TEST (FECAL DNA COLORECTAL CANCER SCREENING)      7.  Medicare annual wellness visit, subsequent  Z00.00 V70.0           Orders Placed This Encounter    HEMOGLOBIN A1C WITH EAG    METABOLIC PANEL, BASIC    MICROALBUMIN, UR, RAND W/ MICROALB/CREAT RATIO    LIPID PANEL    COLOGUARD TEST (FECAL DNA COLORECTAL CANCER SCREENING)    REFERRAL TO ACP CLINICAL SPECIALIST Follow-up and Dispositions    Return in about 6 months (around 5/22/2023), or if symptoms worsen or fail to improve, for Blood sugar check.          Emma Navarro MD

## 2022-11-22 NOTE — PROGRESS NOTES
Patient identified with two identification factors, Name and Date of Birth. Chief Complaint   Patient presents with    Annual Wellness Visit       Visit Vitals  /77 (BP 1 Location: Left arm, BP Patient Position: Sitting, BP Cuff Size: Adult)   Pulse 73   Temp 98.3 °F (36.8 °C) (Oral)   Resp 17   Ht 5' 9\" (1.753 m)   Wt 187 lb (84.8 kg)   SpO2 99%   BMI 27.62 kg/m²       Health Maintenance Due   Topic    Eye Exam Retinal or Dilated     Colorectal Cancer Screening Combo     Shingrix Vaccine Age 50> (1 of 2)    COVID-19 Vaccine (3 - Booster for Moderna series)    Lipid Screen     Flu Vaccine (1)    Foot Exam Q1     A1C test (Diabetic or Prediabetic)     MICROALBUMIN Q1     Depression Screen     Medicare Yearly Exam         1. \"Have you been to the ER, urgent care clinic since your last visit? Hospitalized since your last visit? \" No    2. \"Have you seen or consulted any other health care providers outside of the [address] [place] [address] since your last visit? \" No     3. For patients aged 39-70: Has the patient had a colonoscopy / FIT/ Cologuard?  No

## 2022-11-23 ENCOUNTER — DOCUMENTATION ONLY (OUTPATIENT)
Dept: CASE MANAGEMENT | Age: 72
End: 2022-11-23

## 2022-11-23 LAB
ALBUMIN/CREAT UR: 5 MG/G CREAT (ref 0–29)
BUN SERPL-MCNC: 15 MG/DL (ref 8–27)
BUN/CREAT SERPL: 13 (ref 10–24)
CALCIUM SERPL-MCNC: 9.8 MG/DL (ref 8.6–10.2)
CHLORIDE SERPL-SCNC: 106 MMOL/L (ref 96–106)
CHOLEST SERPL-MCNC: 132 MG/DL (ref 100–199)
CO2 SERPL-SCNC: 24 MMOL/L (ref 20–29)
CREAT SERPL-MCNC: 1.16 MG/DL (ref 0.76–1.27)
CREAT UR-MCNC: 89.4 MG/DL
EGFR: 67 ML/MIN/1.73
EST. AVERAGE GLUCOSE BLD GHB EST-MCNC: 160 MG/DL
GLUCOSE SERPL-MCNC: 136 MG/DL (ref 70–99)
HBA1C MFR BLD: 7.2 % (ref 4.8–5.6)
HDLC SERPL-MCNC: 33 MG/DL
IMP & REVIEW OF LAB RESULTS: NORMAL
LDLC SERPL CALC-MCNC: 75 MG/DL (ref 0–99)
MICROALBUMIN UR-MCNC: 4.7 UG/ML
POTASSIUM SERPL-SCNC: 5.2 MMOL/L (ref 3.5–5.2)
SODIUM SERPL-SCNC: 144 MMOL/L (ref 134–144)
TRIGL SERPL-MCNC: 133 MG/DL (ref 0–149)
VLDLC SERPL CALC-MCNC: 24 MG/DL (ref 5–40)

## 2022-11-23 NOTE — ACP (ADVANCE CARE PLANNING)
Advance Care Planning   Ambulatory ACP Specialist Patient Outreach    Date:  11/23/2022    ACP Specialist:  Estefany Ghosh LCSW    Outreach call to patient in follow-up to ACP Specialist referral from:    [x] PCP  [] Provider   [] Ambulatory Care Management [] Other     For:                  [x] Advance Directive Assistance              [] Complete Portable DNR order              [x] Complete POST/MOST              [] Code Status Discussion             [] Discuss Goals of Care             [x] Early ACP Decision-Making              [] Other (Specify)    Date Referral Received:11/23/2022    Today's Outreach:  [x] First   [] Second  [] Third       Third outreach made by: [] Phone  [] Email / mail    [] [place]     Intervention:  [x] Spoke with Patient   [] Left VM requesting return call      Outcome:  Documenting on the behalf of Kalpana Dangelo:  Patient declined ACP assistance at this time and further outreach from ACP. Patient wants to discuss with spouse and possibly reconsider having a conversation with ACP Specialist sometime after the holidays. Patient acknowledged current referral will be closed and he will speak to PCP at next scheduled appointment to  have a new ACP referral created if he chooses to. Next Step:   [] ACP scheduled conversation  [] Outreach again in one week               [] Email / Mail ACP Info Sheets  [] Email / Mail Advance Directive   [x] Closing referral.  Routing closure to referring provider/staff and to ACP Specialist . [] Closure letter mailed to patient with invitation to contact ACP Specialist if / when ready. Thank you for this referral.    Sun Moon.  [facility] [place]   [phone]

## 2022-11-25 NOTE — PROGRESS NOTES
Lipid panel stable  BMP normal  A1c slightly elevated from last year, but would still be at goal <7.5%    Letter sent with results.

## 2023-01-26 RX ORDER — METFORMIN HYDROCHLORIDE 1000 MG/1
TABLET ORAL
Qty: 180 TABLET | Refills: 0 | Status: SHIPPED | OUTPATIENT
Start: 2023-01-26

## 2023-02-02 ENCOUNTER — TELEPHONE (OUTPATIENT)
Dept: FAMILY MEDICINE CLINIC | Age: 73
End: 2023-02-02

## 2023-02-02 DIAGNOSIS — R19.5 POSITIVE COLORECTAL CANCER SCREENING USING COLOGUARD TEST: Primary | ICD-10-CM

## 2023-02-02 DIAGNOSIS — L98.9 SKIN LESION: ICD-10-CM

## 2023-02-02 NOTE — PROGRESS NOTES
Positive Cologuard test.   Called pt to discuss. Recommend colonoscopy, pt agreeable. Pt also concerned re: skin lesions. Requesting dermatology referral. Zenia Kendrick today. Diagnoses and all orders for this visit:    1.  Positive colorectal cancer screening using Cologuard test  -     REFERRAL TO GASTROENTEROLOGY    2. Skin lesion  -     REFERRAL TO DERMATOLOGY

## 2023-02-02 NOTE — LETTER
2/2/2023 2:47 PM    Mr. Trina King  [address] [facility] [address]      Mr. Kiera Jimenez,     I have reviewed your results from the Cologuard test, a screening for colon cancer. This test was positive. This results means that on colonoscopy there might be:     Colon cancer (4% chance)  Large pre-cancerous polyp (20% chance)  Small pre-cancerous polyp (31% chance)  No evidence of colon cancer or pre-cancerous findings (45% chance). I do recommend talking with a Gastroenterologist about getting a colonoscopy. I will place a referral.     Please let me know if you have any questions.      Sincerely,      Susan Denise MD

## 2023-03-27 ENCOUNTER — OFFICE VISIT (OUTPATIENT)
Dept: FAMILY MEDICINE CLINIC | Age: 73
End: 2023-03-27
Payer: MEDICARE

## 2023-03-27 VITALS
TEMPERATURE: 98.4 F | OXYGEN SATURATION: 97 % | DIASTOLIC BLOOD PRESSURE: 64 MMHG | SYSTOLIC BLOOD PRESSURE: 129 MMHG | WEIGHT: 189.4 LBS | BODY MASS INDEX: 27.97 KG/M2 | HEART RATE: 66 BPM

## 2023-03-27 DIAGNOSIS — E11.9 TYPE 2 DIABETES MELLITUS WITHOUT COMPLICATION, WITHOUT LONG-TERM CURRENT USE OF INSULIN (HCC): ICD-10-CM

## 2023-03-27 DIAGNOSIS — E78.00 PURE HYPERCHOLESTEROLEMIA: Primary | ICD-10-CM

## 2023-03-27 DIAGNOSIS — Z95.1 S/P CABG X 4: ICD-10-CM

## 2023-03-27 PROCEDURE — 3046F HEMOGLOBIN A1C LEVEL >9.0%: CPT | Performed by: STUDENT IN AN ORGANIZED HEALTH CARE EDUCATION/TRAINING PROGRAM

## 2023-03-27 PROCEDURE — G8536 NO DOC ELDER MAL SCRN: HCPCS | Performed by: STUDENT IN AN ORGANIZED HEALTH CARE EDUCATION/TRAINING PROGRAM

## 2023-03-27 PROCEDURE — 3017F COLORECTAL CA SCREEN DOC REV: CPT | Performed by: STUDENT IN AN ORGANIZED HEALTH CARE EDUCATION/TRAINING PROGRAM

## 2023-03-27 PROCEDURE — 1123F ACP DISCUSS/DSCN MKR DOCD: CPT | Performed by: STUDENT IN AN ORGANIZED HEALTH CARE EDUCATION/TRAINING PROGRAM

## 2023-03-27 PROCEDURE — G8427 DOCREV CUR MEDS BY ELIG CLIN: HCPCS | Performed by: STUDENT IN AN ORGANIZED HEALTH CARE EDUCATION/TRAINING PROGRAM

## 2023-03-27 PROCEDURE — 2022F DILAT RTA XM EVC RTNOPTHY: CPT | Performed by: STUDENT IN AN ORGANIZED HEALTH CARE EDUCATION/TRAINING PROGRAM

## 2023-03-27 PROCEDURE — 99214 OFFICE O/P EST MOD 30 MIN: CPT | Performed by: STUDENT IN AN ORGANIZED HEALTH CARE EDUCATION/TRAINING PROGRAM

## 2023-03-27 PROCEDURE — 3078F DIAST BP <80 MM HG: CPT | Performed by: STUDENT IN AN ORGANIZED HEALTH CARE EDUCATION/TRAINING PROGRAM

## 2023-03-27 PROCEDURE — G8417 CALC BMI ABV UP PARAM F/U: HCPCS | Performed by: STUDENT IN AN ORGANIZED HEALTH CARE EDUCATION/TRAINING PROGRAM

## 2023-03-27 PROCEDURE — 3074F SYST BP LT 130 MM HG: CPT | Performed by: STUDENT IN AN ORGANIZED HEALTH CARE EDUCATION/TRAINING PROGRAM

## 2023-03-27 PROCEDURE — 1101F PT FALLS ASSESS-DOCD LE1/YR: CPT | Performed by: STUDENT IN AN ORGANIZED HEALTH CARE EDUCATION/TRAINING PROGRAM

## 2023-03-27 PROCEDURE — G8510 SCR DEP NEG, NO PLAN REQD: HCPCS | Performed by: STUDENT IN AN ORGANIZED HEALTH CARE EDUCATION/TRAINING PROGRAM

## 2023-03-27 NOTE — PROGRESS NOTES
SUBJECTIVE  Marianne Bermeo is an 67 y.o. male who presents to discuss medications side effects    #Hypertension  The patient is here for f/u of HTN. The goal blood pressure is <130/80. The patient is currently taking the following medications: losartan  The patient does check BP at home and all BP have  been at goal.   The patient endorses dizziness and other side effects from the current BP medications. Cardiologist - Dr. Belén Abebe     #Diabetes  The patient is here for follow up of diabetes. Current medications include metformin  The patient does  check blood sugar at home and reports that these have been 150s fasting  The patient denies side effects from medications and low blood glucose. Allergies   Allergies   Allergen Reactions    Amoxicillin Hives         Medications   Current Outpatient Medications   Medication Sig    metFORMIN (GLUCOPHAGE) 1,000 mg tablet TAKE ONE TABLET BY MOUTH TWICE WITH MEALS    atorvastatin (LIPITOR) 40 mg tablet TAKE ONE TABLET BY MOUTH ONE TIME DAILY    Centrum Silver 0.4 mg-300 mcg- 250 mcg tab tablet TAKE ONE TABLET BY MOUTH ONE TIME DAILY    aspirin (ASPIRIN) 325 mg tablet Take 1 Tablet by mouth daily. FeroSuL 325 mg (65 mg iron) tablet TAKE ONE TABLET BY MOUTH EVERY MORNING BEFORE BREAKFAST    losartan (COZAAR) 25 mg tablet     metoprolol succinate (TOPROL-XL) 25 mg XL tablet Take 12.5 mg by mouth daily. glucose blood VI test strips (FreeStyle Lite Strips) strip Use to check blood sugars once daily    escitalopram oxalate (LEXAPRO) 5 mg tablet      No current facility-administered medications for this visit. Past surgical, medical and social history reviewed and updated as relevant to presenting concerns. ROS: See HPI      OBJECTIVE  Visit Vitals  /64   Pulse 66   Temp 98.4 °F (36.9 °C) (Oral)   Wt 189 lb 6.4 oz (85.9 kg)   SpO2 97%   BMI 27.97 kg/m²       Physical Exam  General: No acute distress.    HEENT: Conjunctiva are clear, sclera non-icteric. Respiratory: Normal work of breathing, talking in full sentences without issue  Musculoskeletal: Normal gait. Skin: No abnormalities or rashes   Neuro: Alert, oriented, speech clear and coherent  Psychiatric: Normal mood and affect      ASSESSMENT & PLAN  1. Pure hypercholesterolemia  S/p CABG x4  On atorvastatin for secondary prevention, worried that this is increasing blood sugar. Will recheck lipid panel today. Consider switch to crestor - provided pt with information so he can read about it first.   - LIPID PANEL; Future    2. Type 2 diabetes mellitus without complication, without long-term current use of insulin (HCC)  Elevated fasting glucose in last 3 months, will recheck. Consider adding rybelsus for cardiac benefit and better glucose control. Provided information and will decide based on labs. - HEMOGLOBIN A1C WITH EAG; Future      Follow-up and Dispositions    Return in about 6 months (around 9/27/2023) for Diabetes, Blood pressure check. I have discussed the diagnosis with the patient and the intended plan as seen in the above orders. Patient verbalized understanding of the plan and agrees with the plan. The patient has received an after-visit summary and questions were answered concerning future plans. I have discussed medication side effects and warnings with the patient as well. Informed patient to return to the office if new symptoms arise.         Kala Walls MD

## 2023-03-29 ENCOUNTER — PATIENT MESSAGE (OUTPATIENT)
Dept: FAMILY MEDICINE CLINIC | Age: 73
End: 2023-03-29

## 2023-03-29 LAB
CHOLEST SERPL-MCNC: 113 MG/DL (ref 100–199)
EST. AVERAGE GLUCOSE BLD GHB EST-MCNC: 180 MG/DL
HBA1C MFR BLD: 7.9 % (ref 4.8–5.6)
HDLC SERPL-MCNC: 41 MG/DL
IMP & REVIEW OF LAB RESULTS: NORMAL
LDLC SERPL CALC-MCNC: 46 MG/DL (ref 0–99)
TRIGL SERPL-MCNC: 149 MG/DL (ref 0–149)
VLDLC SERPL CALC-MCNC: 26 MG/DL (ref 5–40)

## 2023-03-29 NOTE — PROGRESS NOTES
A1c slightly elevated from prior, no longer at goal  Lipid panel stable and at goal, good HDL  [facility]

## 2023-04-20 DIAGNOSIS — I25.10 CORONARY ARTERY DISEASE INVOLVING NATIVE HEART, UNSPECIFIED VESSEL OR LESION TYPE, UNSPECIFIED WHETHER ANGINA PRESENT: ICD-10-CM

## 2023-04-20 RX ORDER — METFORMIN HYDROCHLORIDE 1000 MG/1
TABLET ORAL
Qty: 180 TABLET | Refills: 0 | Status: SHIPPED | OUTPATIENT
Start: 2023-04-20

## 2023-04-20 RX ORDER — ASPIRIN 325 MG
TABLET ORAL
Qty: 90 TABLET | Refills: 1 | Status: SHIPPED | OUTPATIENT
Start: 2023-04-20 | End: 2023-04-21 | Stop reason: SDUPTHER

## 2023-04-21 DIAGNOSIS — I25.10 CORONARY ARTERY DISEASE INVOLVING NATIVE HEART, UNSPECIFIED VESSEL OR LESION TYPE, UNSPECIFIED WHETHER ANGINA PRESENT: ICD-10-CM

## 2023-04-21 RX ORDER — FERROUS SULFATE TAB 325 MG (65 MG ELEMENTAL FE) 325 (65 FE) MG
TAB ORAL
Qty: 90 TABLET | Refills: 0 | Status: SHIPPED | OUTPATIENT
Start: 2023-04-21

## 2023-04-21 RX ORDER — ASPIRIN 325 MG
TABLET ORAL
Qty: 90 TABLET | Refills: 1 | Status: SHIPPED | OUTPATIENT
Start: 2023-04-21

## 2023-05-25 RX ORDER — ATORVASTATIN CALCIUM 40 MG/1
1 TABLET, FILM COATED ORAL DAILY
COMMUNITY
Start: 2022-09-29

## 2023-05-25 RX ORDER — METOPROLOL SUCCINATE 25 MG/1
12.5 TABLET, EXTENDED RELEASE ORAL DAILY
COMMUNITY
Start: 2021-10-30

## 2023-05-25 RX ORDER — LOSARTAN POTASSIUM 25 MG/1
TABLET ORAL
COMMUNITY
Start: 2021-10-26

## 2023-05-25 RX ORDER — ASPIRIN 325 MG
1 TABLET ORAL DAILY
COMMUNITY
Start: 2023-04-21

## 2023-05-25 RX ORDER — ESCITALOPRAM OXALATE 5 MG/1
TABLET ORAL
COMMUNITY
Start: 2021-01-07

## 2023-05-25 RX ORDER — FERROUS SULFATE 325(65) MG
1 TABLET ORAL
COMMUNITY
Start: 2023-04-21

## 2023-06-05 ENCOUNTER — HOSPITAL ENCOUNTER (OUTPATIENT)
Facility: HOSPITAL | Age: 73
Setting detail: OUTPATIENT SURGERY
Discharge: HOME | End: 2023-06-05
Attending: INTERNAL MEDICINE | Admitting: INTERNAL MEDICINE
Payer: MEDICARE

## 2023-06-05 ENCOUNTER — ANESTHESIA EVENT (OUTPATIENT)
Facility: HOSPITAL | Age: 73
End: 2023-06-05
Payer: MEDICARE

## 2023-06-05 ENCOUNTER — ANESTHESIA (OUTPATIENT)
Facility: HOSPITAL | Age: 73
End: 2023-06-05
Payer: MEDICARE

## 2023-06-05 VITALS
RESPIRATION RATE: 18 BRPM | OXYGEN SATURATION: 98 % | WEIGHT: 188.27 LBS | SYSTOLIC BLOOD PRESSURE: 123 MMHG | BODY MASS INDEX: 27.89 KG/M2 | HEIGHT: 69 IN | HEART RATE: 64 BPM | DIASTOLIC BLOOD PRESSURE: 62 MMHG | TEMPERATURE: 98.1 F

## 2023-06-05 LAB
GLUCOSE BLD STRIP.AUTO-MCNC: 126 MG/DL (ref 65–117)
HELIOBACTER PYLORI ID: NEGATIVE
SERVICE CMNT-IMP: ABNORMAL

## 2023-06-05 PROCEDURE — 3700000000 HC ANESTHESIA ATTENDED CARE: Performed by: INTERNAL MEDICINE

## 2023-06-05 PROCEDURE — 3600007502: Performed by: INTERNAL MEDICINE

## 2023-06-05 PROCEDURE — 7100000010 HC PHASE II RECOVERY - FIRST 15 MIN: Performed by: INTERNAL MEDICINE

## 2023-06-05 PROCEDURE — 2580000003 HC RX 258: Performed by: INTERNAL MEDICINE

## 2023-06-05 PROCEDURE — 3600007512: Performed by: INTERNAL MEDICINE

## 2023-06-05 PROCEDURE — 82962 GLUCOSE BLOOD TEST: CPT

## 2023-06-05 PROCEDURE — 2500000003 HC RX 250 WO HCPCS: Performed by: NURSE ANESTHETIST, CERTIFIED REGISTERED

## 2023-06-05 PROCEDURE — 3700000001 HC ADD 15 MINUTES (ANESTHESIA): Performed by: INTERNAL MEDICINE

## 2023-06-05 PROCEDURE — 88305 TISSUE EXAM BY PATHOLOGIST: CPT

## 2023-06-05 PROCEDURE — 6360000002 HC RX W HCPCS: Performed by: NURSE ANESTHETIST, CERTIFIED REGISTERED

## 2023-06-05 PROCEDURE — 2709999900 HC NON-CHARGEABLE SUPPLY: Performed by: INTERNAL MEDICINE

## 2023-06-05 PROCEDURE — 7100000011 HC PHASE II RECOVERY - ADDTL 15 MIN: Performed by: INTERNAL MEDICINE

## 2023-06-05 RX ORDER — SODIUM CHLORIDE 0.9 % (FLUSH) 0.9 %
5-40 SYRINGE (ML) INJECTION PRN
Status: DISCONTINUED | OUTPATIENT
Start: 2023-06-05 | End: 2023-06-05 | Stop reason: HOSPADM

## 2023-06-05 RX ORDER — PROPOFOL 10 MG/ML
INJECTION, EMULSION INTRAVENOUS CONTINUOUS PRN
Status: DISCONTINUED | OUTPATIENT
Start: 2023-06-05 | End: 2023-06-05 | Stop reason: SDUPTHER

## 2023-06-05 RX ORDER — SODIUM CHLORIDE 9 MG/ML
25 INJECTION, SOLUTION INTRAVENOUS PRN
Status: DISCONTINUED | OUTPATIENT
Start: 2023-06-05 | End: 2023-06-05 | Stop reason: HOSPADM

## 2023-06-05 RX ORDER — SODIUM CHLORIDE 0.9 % (FLUSH) 0.9 %
5-40 SYRINGE (ML) INJECTION EVERY 12 HOURS SCHEDULED
Status: DISCONTINUED | OUTPATIENT
Start: 2023-06-05 | End: 2023-06-05 | Stop reason: HOSPADM

## 2023-06-05 RX ORDER — DEXMEDETOMIDINE HYDROCHLORIDE 100 UG/ML
INJECTION, SOLUTION INTRAVENOUS PRN
Status: DISCONTINUED | OUTPATIENT
Start: 2023-06-05 | End: 2023-06-05 | Stop reason: SDUPTHER

## 2023-06-05 RX ADMIN — PROPOFOL 50 MG: 10 INJECTION, EMULSION INTRAVENOUS at 12:26

## 2023-06-05 RX ADMIN — PROPOFOL 180 MCG/KG/MIN: 10 INJECTION, EMULSION INTRAVENOUS at 12:24

## 2023-06-05 RX ADMIN — LIDOCAINE HYDROCHLORIDE 100 MG: 20 INJECTION, SOLUTION INFILTRATION; PERINEURAL at 12:24

## 2023-06-05 RX ADMIN — DEXMEDETOMIDINE 10 MCG: 100 INJECTION, SOLUTION INTRAVENOUS at 12:23

## 2023-06-05 RX ADMIN — SODIUM CHLORIDE: 9 INJECTION, SOLUTION INTRAVENOUS at 12:24

## 2023-06-05 ASSESSMENT — PAIN - FUNCTIONAL ASSESSMENT: PAIN_FUNCTIONAL_ASSESSMENT: 0-10

## 2023-06-05 NOTE — ANESTHESIA PRE PROCEDURE
Department of Anesthesiology  Preprocedure Note       Name:  Jersey Leigh   Age:  67 y.o.  :  1950                                          MRN:  [id number]         Date:  2023      Surgeon: Yelitza Bridges):  Reece Royal MD    Procedure: Procedure(s):  COLONOSCOPY  ESOPHAGOGASTRODUODENOSCOPY    Medications prior to admission:   Prior to Admission medications    Medication Sig Start Date End Date Taking? Authorizing Provider   aspirin 325 MG tablet Take 1 tablet by mouth daily 23   Ar Automatic Reconciliation   atorvastatin (LIPITOR) 40 MG tablet Take 1 tablet by mouth daily 22   Ar Automatic Reconciliation   escitalopram (LEXAPRO) 5 MG tablet ceived the following from [facility]. 32 - [facility]: Outside name: escitalopram oxalate (LEXAPRO) 5 mg tablet 21   Ar Automatic Reconciliation   ferrous sulfate (IRON 325) 325 (65 Fe) MG tablet Take 1 tablet by mouth every morning (before breakfast)  Patient not taking: Reported on 2023   Ar Automatic Reconciliation   losartan (COZAAR) 25 MG tablet ceived the following from [facility]. 32 - [facility]: Outside name: losartan (COZAAR) 25 mg tablet 10/26/21   Ar Automatic Reconciliation   metFORMIN (GLUCOPHAGE) 1000 MG tablet TAKE ONE TABLET BY MOUTH TWICE A DAY WITH A MEAL 23   Ar Automatic Reconciliation   metoprolol succinate (TOPROL XL) 25 MG extended release tablet Take 0.5 tablets by mouth daily 10/30/21   Ar Automatic Reconciliation       Current medications:    Current Facility-Administered Medications   Medication Dose Route Frequency Provider Last Rate Last Admin    sodium chloride flush 0.9 % injection 5-40 mL  5-40 mL IntraVENous 2 times per day Reece Royal MD        sodium chloride flush 0.9 % injection 5-40 mL  5-40 mL IntraVENous PRN Reece Royal MD        0.9 % sodium chloride infusion  25 mL IntraVENous PRN Reece Royal MD           Allergies:     Allergies   Allergen Reactions    Amoxicillin Hives       Problem List:

## 2023-06-05 NOTE — OP NOTE
for SAMIA test    Specimens: SAMIA test only    Implants: None           Endoscopist:  Dr. Araseli Ornelas  Assistants: None  Complications:  None  Estimate Blood Loss:  None    Colonoscopy is to follow    Permit:  The indications, risks, benefits and alternatives were reviewed with the patient or their decision maker who was provided an opportunity to ask questions and all questions were answered. The specific risks of colonoscopy with conscious sedation were reviewed, including but not limited to anesthetic complication, bleeding, adverse drug reaction, missed lesion, infection, IV site reactions, and intestinal perforation which would lead to the need for surgical repair. Alternatives to colonoscopy including radiographic imaging, observation without testing, or laboratory testing were reviewed including the limitations of those alternatives. After considering the options and having all their questions answered, the patient or their decision maker provided both verbal and written consent to proceed. Procedure in Detail:  After obtaining informed consent, positioning of the patient in the left lateral decubitus position, and conduction of a pre-procedure pause or \"time out\" the endoscope was introduced into the anus and advanced to the terminal ileum. The quality of the colonic preparation was good. A careful inspection was made as the colonoscope was withdrawn, findings and interventions are described below. Appendiceal orifice photographed    Findings:       -Diverticulosis without inflammation  Total of 8 polyps all sessile present ascending, transverse; largest is under 8 mm in size. Specimens:   All polyps removed with cold snare    Implants: None    Complications:   None; patient tolerated the procedure well. Estimated blood loss: none    Impression:  Gastritis likely secondary to longstanding daily aspirin use; also has colon polyps, diverticulosis of the colon.     Recommendations:   Misoprostol

## 2023-06-05 NOTE — ANESTHESIA POSTPROCEDURE EVALUATION
Department of Anesthesiology  Postprocedure Note    Patient: Herve Kingston  MRN: [id number]  YOB: 1950  Date of evaluation: 6/5/2023      Procedure Summary     Date: 06/05/23 Room / Location: [place] [address] / [facility] ENDOSCOPY    Anesthesia Start: 8767 Anesthesia Stop: 9185    Procedures:       COLONOSCOPY (Lower GI Region)      ESOPHAGOGASTRODUODENOSCOPY (Upper GI Region)      EGD BIOPSY (Upper GI Region) Diagnosis:       Nonspecific abnormal finding in stool contents      (Nonspecific abnormal finding in stool contents [R19.5])    Surgeons: Emmit Severance, MD Responsible Provider: King Franklin MD    Anesthesia Type: MAC ASA Status: 3          Anesthesia Type: No value filed.     Dayan Phase I: Dayan Score: 9    Dayan Phase II: Dayan Score: 10      Anesthesia Post Evaluation    Patient location during evaluation: bedside  Patient participation: complete - patient participated  Level of consciousness: awake  Airway patency: patent  Nausea & Vomiting: no vomiting and no nausea  Complications: no  Cardiovascular status: hemodynamically stable  Respiratory status: acceptable  Hydration status: stable

## 2023-06-05 NOTE — H&P
affect: Normal mood, affect full,no evidence of depression, anxiety or agitation. Impressions:   Nonspecific abnormal finding in stool contents    Plan:   I've discussed EGD, colonoscopy possible biopsy, polypectomy, cautery, injection, alternatives, complications including but not limited to pain, cardiopulmonary event, bleeding, perforation requiring additional blood transfusion or operative repair; all questions answered.

## 2023-06-05 NOTE — PROGRESS NOTES
[facility]  1950  [id number]    Situation:  Verbal report received from: Omarmerced Cabezas  Procedure: Procedure(s):  COLONOSCOPY  ESOPHAGOGASTRODUODENOSCOPY  EGD BIOPSY     Background:    Preoperative diagnosis: Nonspecific abnormal finding in stool contents [R19.5]   Postoperative diagnosis: * No post-op diagnosis entered *     :  Dr. Jason Jimenez  Assistant(s): Circulator: Marisela Dejesus RN  Relief Scrub: Kathleen Dill RN     Specimens:   ID Type Source Tests Collected by Time Destination   A : Colon Polyps. Tissue Colon SURGICAL PATHOLOGY Priyanka Brennan MD 6/5/2023 1241       H. Pylori  Yes    Assessment:  Intra-procedure medications     Anesthesia gave intra-procedure sedation and medications, see anesthesia flow sheet Yes    Intravenous fluids: NS@ KVO     Vital signs stable yes    Abdominal assessment: round and soft yes    Recommendation:  Discharge patient per MD order yes.   Return to floor na  Family or Friend family  Permission to share finding with family or friend Yes

## 2023-06-05 NOTE — DISCHARGE INSTRUCTIONS
Chino Niko  [id number]  1950    DISCHARGE INSTRUCTIONS    Impression:  Gastritis likely secondary to longstanding daily aspirin use; also has colon polyps, diverticulosis of the colon. Recommendations:   Misoprostol twice daily as long as using aspirin regularly; assuming overall health is stable repeat colonoscopy exam 3 years    Discomfort:  Redness at IV site- apply warm compress to area; if redness or soreness persist- contact your physician. There may be a slight amount of blood passed from the rectum. Gaseous discomfort - walking, belching will help relieve any discomfort. You may not operate a vehicle for 12 hours. You may not engage in an occupation involving machinery or appliances for rest of today. You may not drink alcoholic beverages for at least 12 hours. Avoid making any critical decisions for at least 24 hours. DIET:   High fiber diet. Medications:                Resume usual medications today   ACTIVITY:  You may resume your normal daily activities it is recommended that you spend the remainder of the day resting -  avoid any strenuous activity. CALL M.D.   ANY SIGN OF:   Increasing pain, nausea, vomiting  Abdominal distension (swelling)  New increased bleeding (oral or rectal)  Fever (chills)  Pain in chest area  Bloody discharge from nose or mouth  Shortness of breath     Follow-up Instructions:  Call Dr. Adelita Gonsales if you have any questions or problems        DISCHARGE SUMMARY from Nurse    The following personal items collected during your admission are returned to you:   Dental Appliance:    Vision:    Hearing Aid:    Jewelry:    Clothing:    Other Valuables:    Valuables sent to safe: Dose (mL/hr) Propofol : 0 mL/hr

## 2023-06-05 NOTE — PERIOP NOTE
Nursing report given to Lane Barnhart RN. Patient tolerated procedure without problems. Abdomen soft and patient arousable and voices no complaints Report received from [address] [place] [address], see anesthesia note. Patient transported to endoscopy recovery area. Scope cleaned by Danie Mcbride RN.

## 2023-06-05 NOTE — PROGRESS NOTES
Endoscopy discharge instructions have been reviewed and given to patient. The patient verbalized understanding and acceptance of instructions. Dr. Dexter Lawton discussed with patient procedure findings and next steps. Glasses returned to patient.

## 2023-08-11 RX ORDER — FERROUS SULFATE 325(65) MG
1 TABLET ORAL
Qty: 30 TABLET | Refills: 0 | Status: SHIPPED | OUTPATIENT
Start: 2023-08-11

## 2023-10-18 NOTE — TELEPHONE ENCOUNTER
Medication Refill Request    Asaf Palacio is requesting a refill of the following medication(s):   Requested Prescriptions     Pending Prescriptions Disp Refills    aspirin 325 MG tablet [Pharmacy Med Name: ASPIRIN 325 MG TAB] 90 tablet 1     Sig: TAKE ONE TABLET BY MOUTH ONE TIME DAILY      Last provider to prescribe medication:Dr. John Light   Last Date of Medication Prescribed: 04/21/2023   Last Office Visit Date: 03/27/2023 - [place]    Please send refill to:    [address] [phone]  [facility] [address]  Phone: [phone] Fax: [phone]

## 2023-10-19 RX ORDER — ASPIRIN 325 MG
325 TABLET ORAL DAILY
Qty: 90 TABLET | Refills: 1 | Status: SHIPPED | OUTPATIENT
Start: 2023-10-19

## 2023-11-03 ENCOUNTER — OFFICE VISIT (OUTPATIENT)
Age: 73
End: 2023-11-03
Payer: MEDICARE

## 2023-11-03 VITALS
HEART RATE: 70 BPM | SYSTOLIC BLOOD PRESSURE: 110 MMHG | TEMPERATURE: 98.8 F | OXYGEN SATURATION: 97 % | RESPIRATION RATE: 16 BRPM | DIASTOLIC BLOOD PRESSURE: 72 MMHG | BODY MASS INDEX: 27.11 KG/M2 | WEIGHT: 183 LBS | HEIGHT: 69 IN

## 2023-11-03 DIAGNOSIS — I10 ESSENTIAL (PRIMARY) HYPERTENSION: ICD-10-CM

## 2023-11-03 DIAGNOSIS — Z95.1 S/P CABG X 4: ICD-10-CM

## 2023-11-03 DIAGNOSIS — Z23 ENCOUNTER FOR IMMUNIZATION: ICD-10-CM

## 2023-11-03 DIAGNOSIS — Z95.1 PRESENCE OF AORTOCORONARY BYPASS GRAFT: ICD-10-CM

## 2023-11-03 DIAGNOSIS — I25.810 ATHEROSCLEROSIS OF CORONARY ARTERY BYPASS GRAFT OF NATIVE HEART WITHOUT ANGINA PECTORIS: ICD-10-CM

## 2023-11-03 DIAGNOSIS — E11.42 TYPE 2 DIABETES MELLITUS WITH DIABETIC POLYNEUROPATHY, WITHOUT LONG-TERM CURRENT USE OF INSULIN (HCC): ICD-10-CM

## 2023-11-03 DIAGNOSIS — E11.59 TYPE 2 DIABETES MELLITUS WITH OTHER CIRCULATORY COMPLICATION, WITHOUT LONG-TERM CURRENT USE OF INSULIN (HCC): Primary | ICD-10-CM

## 2023-11-03 PROCEDURE — 99214 OFFICE O/P EST MOD 30 MIN: CPT

## 2023-11-03 PROCEDURE — 90694 VACC AIIV4 NO PRSRV 0.5ML IM: CPT

## 2023-11-03 RX ORDER — MISOPROSTOL 200 UG/1
200 TABLET ORAL 2 TIMES DAILY
COMMUNITY

## 2023-11-03 RX ORDER — ROSUVASTATIN CALCIUM 40 MG/1
40 TABLET, COATED ORAL EVERY EVENING
COMMUNITY

## 2023-11-03 SDOH — ECONOMIC STABILITY: FOOD INSECURITY: WITHIN THE PAST 12 MONTHS, YOU WORRIED THAT YOUR FOOD WOULD RUN OUT BEFORE YOU GOT MONEY TO BUY MORE.: NEVER TRUE

## 2023-11-03 SDOH — ECONOMIC STABILITY: HOUSING INSECURITY
IN THE LAST 12 MONTHS, WAS THERE A TIME WHEN YOU DID NOT HAVE A STEADY PLACE TO SLEEP OR SLEPT IN A SHELTER (INCLUDING NOW)?: NO

## 2023-11-03 SDOH — ECONOMIC STABILITY: FOOD INSECURITY: WITHIN THE PAST 12 MONTHS, THE FOOD YOU BOUGHT JUST DIDN'T LAST AND YOU DIDN'T HAVE MONEY TO GET MORE.: NEVER TRUE

## 2023-11-03 SDOH — ECONOMIC STABILITY: INCOME INSECURITY: HOW HARD IS IT FOR YOU TO PAY FOR THE VERY BASICS LIKE FOOD, HOUSING, MEDICAL CARE, AND HEATING?: NOT VERY HARD

## 2023-11-03 ASSESSMENT — PATIENT HEALTH QUESTIONNAIRE - PHQ9
SUM OF ALL RESPONSES TO PHQ QUESTIONS 1-9: 1
9. THOUGHTS THAT YOU WOULD BE BETTER OFF DEAD, OR OF HURTING YOURSELF: 0
SUM OF ALL RESPONSES TO PHQ QUESTIONS 1-9: 1
8. MOVING OR SPEAKING SO SLOWLY THAT OTHER PEOPLE COULD HAVE NOTICED. OR THE OPPOSITE, BEING SO FIGETY OR RESTLESS THAT YOU HAVE BEEN MOVING AROUND A LOT MORE THAN USUAL: 0
SUM OF ALL RESPONSES TO PHQ9 QUESTIONS 1 & 2: 0
SUM OF ALL RESPONSES TO PHQ QUESTIONS 1-9: 1
1. LITTLE INTEREST OR PLEASURE IN DOING THINGS: 0
2. FEELING DOWN, DEPRESSED OR HOPELESS: 0
10. IF YOU CHECKED OFF ANY PROBLEMS, HOW DIFFICULT HAVE THESE PROBLEMS MADE IT FOR YOU TO DO YOUR WORK, TAKE CARE OF THINGS AT HOME, OR GET ALONG WITH OTHER PEOPLE: 0
6. FEELING BAD ABOUT YOURSELF - OR THAT YOU ARE A FAILURE OR HAVE LET YOURSELF OR YOUR FAMILY DOWN: 0
7. TROUBLE CONCENTRATING ON THINGS, SUCH AS READING THE NEWSPAPER OR WATCHING TELEVISION: 0
4. FEELING TIRED OR HAVING LITTLE ENERGY: 1
5. POOR APPETITE OR OVEREATING: 0
3. TROUBLE FALLING OR STAYING ASLEEP: 0
SUM OF ALL RESPONSES TO PHQ QUESTIONS 1-9: 1

## 2023-11-04 LAB
ALBUMIN SERPL-MCNC: 4.8 G/DL (ref 3.8–4.8)
ALBUMIN/CREAT UR: 51 MG/G CREAT (ref 0–29)
ALBUMIN/GLOB SERPL: 2.1 {RATIO} (ref 1.2–2.2)
ALP SERPL-CCNC: 86 IU/L (ref 44–121)
ALT SERPL-CCNC: 12 IU/L (ref 0–44)
AST SERPL-CCNC: 20 IU/L (ref 0–40)
BASOPHILS # BLD AUTO: 0.1 X10E3/UL (ref 0–0.2)
BASOPHILS NFR BLD AUTO: 1 %
BILIRUB SERPL-MCNC: 0.2 MG/DL (ref 0–1.2)
BUN SERPL-MCNC: 21 MG/DL (ref 8–27)
BUN/CREAT SERPL: 18 (ref 10–24)
CALCIUM SERPL-MCNC: 9.8 MG/DL (ref 8.6–10.2)
CHLORIDE SERPL-SCNC: 106 MMOL/L (ref 96–106)
CHOLEST SERPL-MCNC: 109 MG/DL (ref 100–199)
CO2 SERPL-SCNC: 22 MMOL/L (ref 20–29)
CREAT SERPL-MCNC: 1.16 MG/DL (ref 0.76–1.27)
CREAT UR-MCNC: 63.8 MG/DL
EGFRCR SERPLBLD CKD-EPI 2021: 67 ML/MIN/1.73
EOSINOPHIL # BLD AUTO: 0.2 X10E3/UL (ref 0–0.4)
EOSINOPHIL NFR BLD AUTO: 3 %
ERYTHROCYTE [DISTWIDTH] IN BLOOD BY AUTOMATED COUNT: 13.4 % (ref 11.6–15.4)
GLOBULIN SER CALC-MCNC: 2.3 G/DL (ref 1.5–4.5)
GLUCOSE SERPL-MCNC: 113 MG/DL (ref 70–99)
HBA1C MFR BLD: 7.2 % (ref 4.8–5.6)
HCT VFR BLD AUTO: 40.7 % (ref 37.5–51)
HDLC SERPL-MCNC: 40 MG/DL
HGB BLD-MCNC: 13.6 G/DL (ref 13–17.7)
IMM GRANULOCYTES # BLD AUTO: 0 X10E3/UL (ref 0–0.1)
IMM GRANULOCYTES NFR BLD AUTO: 0 %
IMP & REVIEW OF LAB RESULTS: NORMAL
LDLC SERPL CALC-MCNC: 50 MG/DL (ref 0–99)
LYMPHOCYTES # BLD AUTO: 0.9 X10E3/UL (ref 0.7–3.1)
LYMPHOCYTES NFR BLD AUTO: 15 %
MCH RBC QN AUTO: 29.2 PG (ref 26.6–33)
MCHC RBC AUTO-ENTMCNC: 33.4 G/DL (ref 31.5–35.7)
MCV RBC AUTO: 88 FL (ref 79–97)
MICROALBUMIN UR-MCNC: 32.4 UG/ML
MONOCYTES # BLD AUTO: 0.6 X10E3/UL (ref 0.1–0.9)
MONOCYTES NFR BLD AUTO: 11 %
NEUTROPHILS # BLD AUTO: 4.1 X10E3/UL (ref 1.4–7)
NEUTROPHILS NFR BLD AUTO: 70 %
PLATELET # BLD AUTO: 251 X10E3/UL (ref 150–450)
POTASSIUM SERPL-SCNC: 4.6 MMOL/L (ref 3.5–5.2)
PROT SERPL-MCNC: 7.1 G/DL (ref 6–8.5)
RBC # BLD AUTO: 4.65 X10E6/UL (ref 4.14–5.8)
SODIUM SERPL-SCNC: 142 MMOL/L (ref 134–144)
TRIGL SERPL-MCNC: 102 MG/DL (ref 0–149)
VLDLC SERPL CALC-MCNC: 19 MG/DL (ref 5–40)
WBC # BLD AUTO: 5.8 X10E3/UL (ref 3.4–10.8)

## 2023-11-06 ENCOUNTER — TELEPHONE (OUTPATIENT)
Age: 73
End: 2023-11-06

## 2023-11-06 NOTE — TELEPHONE ENCOUNTER
Pt's daughter called requesting his Freestyle Light Test Strips be sent into the pharmacy at [facility], [address].  Pt's daughter stated that should be [facility]

## 2023-11-09 DIAGNOSIS — E11.9 CONTROLLED TYPE 2 DIABETES MELLITUS WITHOUT COMPLICATION, WITHOUT LONG-TERM CURRENT USE OF INSULIN (HCC): Primary | ICD-10-CM

## 2024-01-18 RX ORDER — FERROUS SULFATE 325(65) MG
1 TABLET ORAL
Qty: 30 TABLET | Refills: 2 | Status: SHIPPED | OUTPATIENT
Start: 2024-01-18

## 2024-06-07 ENCOUNTER — TELEPHONE (OUTPATIENT)
Age: 74
End: 2024-06-07

## 2024-06-07 NOTE — TELEPHONE ENCOUNTER
I called the patient to schedule his pre-op appointment for [facility]. He states that his insurance has already signed off for him to receive the surgery.    I will scan a copy of the form into the patient's media.

## 2024-07-31 NOTE — TELEPHONE ENCOUNTER
Medication Refill Request    Adarsh Mukherjee is requesting a refill of the following medication(s):   Requested Prescriptions     Pending Prescriptions Disp Refills    metFORMIN (GLUCOPHAGE) 1000 MG tablet 180 tablet 0        Listed PCP is Florinda Kingston MD   Last provider to prescribe medication: brinda  Last Date of Medication Prescribed: 02/19/2024  Date of Last Office Visit at [facility]:11/03/2024  FUTURE APPOINTMENT: No future appointments.    Please send refill to:    [id number] - [place], [place] - [id number] [place] [address] - P [phone] F [phone]  [address] [place] [address]  Phone: [phone] Fax: [phone]      Please review request and approve or deny with recommendations.

## 2024-09-17 ENCOUNTER — OFFICE VISIT (OUTPATIENT)
Age: 74
End: 2024-09-17
Payer: MEDICARE

## 2024-09-17 VITALS
WEIGHT: 183 LBS | TEMPERATURE: 98.1 F | HEART RATE: 65 BPM | DIASTOLIC BLOOD PRESSURE: 62 MMHG | BODY MASS INDEX: 27.02 KG/M2 | OXYGEN SATURATION: 95 % | SYSTOLIC BLOOD PRESSURE: 127 MMHG

## 2024-09-17 DIAGNOSIS — E11.59 TYPE 2 DIABETES MELLITUS WITH OTHER CIRCULATORY COMPLICATION, WITHOUT LONG-TERM CURRENT USE OF INSULIN (HCC): ICD-10-CM

## 2024-09-17 DIAGNOSIS — I15.2 HYPERTENSION ASSOCIATED WITH DIABETES (HCC): ICD-10-CM

## 2024-09-17 DIAGNOSIS — Z95.1 S/P CABG X 4: ICD-10-CM

## 2024-09-17 DIAGNOSIS — E11.59 HYPERTENSION ASSOCIATED WITH DIABETES (HCC): ICD-10-CM

## 2024-09-17 DIAGNOSIS — Z23 FLU VACCINE NEED: ICD-10-CM

## 2024-09-17 DIAGNOSIS — E78.5 HYPERLIPIDEMIA ASSOCIATED WITH TYPE 2 DIABETES MELLITUS (HCC): ICD-10-CM

## 2024-09-17 DIAGNOSIS — E11.42 TYPE 2 DIABETES MELLITUS WITH DIABETIC POLYNEUROPATHY, WITHOUT LONG-TERM CURRENT USE OF INSULIN (HCC): Primary | ICD-10-CM

## 2024-09-17 DIAGNOSIS — E11.69 HYPERLIPIDEMIA ASSOCIATED WITH TYPE 2 DIABETES MELLITUS (HCC): ICD-10-CM

## 2024-09-17 PROBLEM — E11.9 DIABETES MELLITUS (HCC): Status: ACTIVE | Noted: 2024-09-17

## 2024-09-17 PROCEDURE — 3078F DIAST BP <80 MM HG: CPT

## 2024-09-17 PROCEDURE — 99214 OFFICE O/P EST MOD 30 MIN: CPT

## 2024-09-17 PROCEDURE — 1123F ACP DISCUSS/DSCN MKR DOCD: CPT

## 2024-09-17 PROCEDURE — 3074F SYST BP LT 130 MM HG: CPT

## 2024-09-17 ASSESSMENT — PATIENT HEALTH QUESTIONNAIRE - PHQ9
2. FEELING DOWN, DEPRESSED OR HOPELESS: NOT AT ALL
SUM OF ALL RESPONSES TO PHQ QUESTIONS 1-9: 0
SUM OF ALL RESPONSES TO PHQ9 QUESTIONS 1 & 2: 0
1. LITTLE INTEREST OR PLEASURE IN DOING THINGS: NOT AT ALL
SUM OF ALL RESPONSES TO PHQ QUESTIONS 1-9: 0

## 2024-09-18 LAB
ALBUMIN SERPL-MCNC: 4.5 G/DL (ref 3.8–4.8)
ALBUMIN/CREAT UR: 34 MG/G CREAT (ref 0–29)
ALP SERPL-CCNC: 97 IU/L (ref 44–121)
ALT SERPL-CCNC: 11 IU/L (ref 0–44)
AST SERPL-CCNC: 20 IU/L (ref 0–40)
BASOPHILS # BLD AUTO: 0.1 X10E3/UL (ref 0–0.2)
BASOPHILS NFR BLD AUTO: 1 %
BILIRUB SERPL-MCNC: 0.2 MG/DL (ref 0–1.2)
BUN SERPL-MCNC: 16 MG/DL (ref 8–27)
BUN/CREAT SERPL: 17 (ref 10–24)
CALCIUM SERPL-MCNC: 9.8 MG/DL (ref 8.6–10.2)
CHLORIDE SERPL-SCNC: 102 MMOL/L (ref 96–106)
CHOLEST SERPL-MCNC: 119 MG/DL (ref 100–199)
CO2 SERPL-SCNC: 24 MMOL/L (ref 20–29)
CREAT SERPL-MCNC: 0.95 MG/DL (ref 0.76–1.27)
CREAT UR-MCNC: 41.4 MG/DL
EGFRCR SERPLBLD CKD-EPI 2021: 85 ML/MIN/1.73
EOSINOPHIL # BLD AUTO: 0.2 X10E3/UL (ref 0–0.4)
EOSINOPHIL NFR BLD AUTO: 4 %
ERYTHROCYTE [DISTWIDTH] IN BLOOD BY AUTOMATED COUNT: 13.5 % (ref 11.6–15.4)
GLOBULIN SER CALC-MCNC: 2.9 G/DL (ref 1.5–4.5)
GLUCOSE SERPL-MCNC: 127 MG/DL (ref 70–99)
HBA1C MFR BLD: 8.1 % (ref 4.8–5.6)
HCT VFR BLD AUTO: 41.8 % (ref 37.5–51)
HDLC SERPL-MCNC: 40 MG/DL
HGB BLD-MCNC: 13.6 G/DL (ref 13–17.7)
IMM GRANULOCYTES # BLD AUTO: 0 X10E3/UL (ref 0–0.1)
IMM GRANULOCYTES NFR BLD AUTO: 0 %
IMP & REVIEW OF LAB RESULTS: NORMAL
LDLC SERPL CALC-MCNC: 50 MG/DL (ref 0–99)
LYMPHOCYTES # BLD AUTO: 0.8 X10E3/UL (ref 0.7–3.1)
LYMPHOCYTES NFR BLD AUTO: 15 %
Lab: NORMAL
Lab: NORMAL
MCH RBC QN AUTO: 29.1 PG (ref 26.6–33)
MCHC RBC AUTO-ENTMCNC: 32.5 G/DL (ref 31.5–35.7)
MCV RBC AUTO: 89 FL (ref 79–97)
MICROALBUMIN UR-MCNC: 14.2 UG/ML
MONOCYTES # BLD AUTO: 0.7 X10E3/UL (ref 0.1–0.9)
MONOCYTES NFR BLD AUTO: 12 %
NEUTROPHILS # BLD AUTO: 3.7 X10E3/UL (ref 1.4–7)
NEUTROPHILS NFR BLD AUTO: 68 %
PLATELET # BLD AUTO: 226 X10E3/UL (ref 150–450)
POTASSIUM SERPL-SCNC: 4.5 MMOL/L (ref 3.5–5.2)
PROT SERPL-MCNC: 7.4 G/DL (ref 6–8.5)
RBC # BLD AUTO: 4.68 X10E6/UL (ref 4.14–5.8)
SODIUM SERPL-SCNC: 141 MMOL/L (ref 134–144)
TRIGL SERPL-MCNC: 171 MG/DL (ref 0–149)
VLDLC SERPL CALC-MCNC: 29 MG/DL (ref 5–40)
WBC # BLD AUTO: 5.4 X10E3/UL (ref 3.4–10.8)

## 2024-09-20 ENCOUNTER — TELEPHONE (OUTPATIENT)
Age: 74
End: 2024-09-20

## 2024-10-03 DIAGNOSIS — E11.42 TYPE 2 DIABETES MELLITUS WITH DIABETIC POLYNEUROPATHY, WITHOUT LONG-TERM CURRENT USE OF INSULIN (HCC): Primary | ICD-10-CM

## 2024-10-03 NOTE — PROGRESS NOTES
Hemoglobin A1C   Date Value Ref Range Status   09/17/2024 8.1 (H) 4.8 - 5.6 % Final     Comment:                 Prediabetes: 5.7 - 6.4           Diabetes: >6.4           Glycemic control for adults with diabetes: <7.0        Discussed results with patient, has been taking medication as prescribed. Metformin is at maximum dose, increasing dose of jardiance to 25 mg daily     1. Type 2 diabetes mellitus with diabetic polyneuropathy, without long-term current use of insulin (HCC)    - empagliflozin (JARDIANCE) 25 MG tablet; Take 1 tablet by mouth daily  Dispense: 90 tablet; Refill: 3

## 2024-10-30 NOTE — TELEPHONE ENCOUNTER
Medication Refill Request    Adarsh Mukherjee is requesting a refill of the following medication(s):   Requested Prescriptions     Pending Prescriptions Disp Refills    metFORMIN (GLUCOPHAGE) 1000 MG tablet [Pharmacy Med Name: METFORMIN 1000 MG TAB] 180 tablet 0     Sig: TAKE ONE TABLET BY MOUTH TWICE A DAY WITH MEALS        Listed PCP is Florinda Kingston MD   Last provider to prescribe medication: Dr. Kingston  Last Date of Medication Prescribed: 07/31/2024   Date of Last Office Visit at [facility]: 09/17/2024     FUTURE APPOINTMENT:   Future Appointments   Date Time Provider Department Center   11/20/2024 10:40 AM Florinda Kingston MD [facility] ECC DEP       Please send refill to:    [id number] - [place], [place] - [id number] [place] [address] [phone] F [phone]  [address] [place] [address]  Phone: [phone] Fax: [phone]      Please review request and approve or deny with recommendations.

## 2024-10-31 NOTE — TELEPHONE ENCOUNTER
Medication Refill Request    Adarsh Mukherjee is requesting a refill of the following medication(s):   Requested Prescriptions     Pending Prescriptions Disp Refills    metFORMIN (GLUCOPHAGE) 1000 MG tablet 180 tablet 0     Sig: Take 1 tablet by mouth 2 times daily (with meals)        Listed PCP is Florinda Kingston MD   Last provider to prescribe medication: Dr. Kingston  Last Date of Medication Prescribed: 07/31/2024   Date of Last Office Visit at [facility]: 09/17/2024 for T2DM     FUTURE APPOINTMENT:   Future Appointments   Date Time Provider Department Center   11/20/2024 10:40 AM Florinda Kingston MD [facility] ECC DEP       Please send refill to:    [id number] - [place], [place] - [id number] [place] [address] [phone] F [phone]  [address] [place] [address]  Phone: [phone] Fax: [phone]      Please review request and approve or deny with recommendations.

## 2024-10-31 NOTE — TELEPHONE ENCOUNTER
The patient visited the office today to f/u on his prescription refill.  I am routing this message to Dr. Kingston to f/u.  I told the patient I would call him back to f/u.    Patient also asked the following medical question I told him I would pass along to Dr. Kingston.  \"He said he is having back pain and wants to know if that is related to the Metformin medication??  \"

## 2024-11-01 ENCOUNTER — TELEPHONE (OUTPATIENT)
Age: 74
End: 2024-11-01

## 2024-11-01 NOTE — TELEPHONE ENCOUNTER
Called and spoke with the patient to let him know that Dr. Kingston approved his prescription refill.   I told him to let me know if I can help him with anything else.

## 2024-11-20 ENCOUNTER — OFFICE VISIT (OUTPATIENT)
Age: 74
End: 2024-11-20

## 2024-11-20 VITALS
RESPIRATION RATE: 18 BRPM | DIASTOLIC BLOOD PRESSURE: 82 MMHG | WEIGHT: 179 LBS | HEART RATE: 65 BPM | OXYGEN SATURATION: 96 % | BODY MASS INDEX: 26.51 KG/M2 | HEIGHT: 69 IN | SYSTOLIC BLOOD PRESSURE: 133 MMHG | TEMPERATURE: 98.1 F

## 2024-11-20 DIAGNOSIS — E11.42 TYPE 2 DIABETES MELLITUS WITH DIABETIC POLYNEUROPATHY, WITHOUT LONG-TERM CURRENT USE OF INSULIN (HCC): ICD-10-CM

## 2024-11-20 DIAGNOSIS — Z00.00 MEDICARE ANNUAL WELLNESS VISIT, SUBSEQUENT: Primary | ICD-10-CM

## 2024-11-20 ASSESSMENT — PATIENT HEALTH QUESTIONNAIRE - PHQ9
1. LITTLE INTEREST OR PLEASURE IN DOING THINGS: NOT AT ALL
SUM OF ALL RESPONSES TO PHQ QUESTIONS 1-9: 0
SUM OF ALL RESPONSES TO PHQ QUESTIONS 1-9: 0
SUM OF ALL RESPONSES TO PHQ9 QUESTIONS 1 & 2: 0
SUM OF ALL RESPONSES TO PHQ QUESTIONS 1-9: 0
SUM OF ALL RESPONSES TO PHQ QUESTIONS 1-9: 0
2. FEELING DOWN, DEPRESSED OR HOPELESS: NOT AT ALL
SUM OF ALL RESPONSES TO PHQ QUESTIONS 1-9: 0
2. FEELING DOWN, DEPRESSED OR HOPELESS: NOT AT ALL
1. LITTLE INTEREST OR PLEASURE IN DOING THINGS: NOT AT ALL
SUM OF ALL RESPONSES TO PHQ QUESTIONS 1-9: 0
SUM OF ALL RESPONSES TO PHQ QUESTIONS 1-9: 0
SUM OF ALL RESPONSES TO PHQ9 QUESTIONS 1 & 2: 0
SUM OF ALL RESPONSES TO PHQ QUESTIONS 1-9: 0

## 2024-11-20 NOTE — PROGRESS NOTES
Patient has been identified by name and .    Chief Complaint   Patient presents with    Medicare AWV     Pt reports for AAWV, no concerns today       Vitals:    24 1033 24 1042   BP: (!) 157/55 133/82   Site: Left Upper Arm Right Upper Arm   Position: Sitting Sitting   Cuff Size: Large Adult Medium Adult   Pulse: 67 65   Resp: 18    Temp: 98.1 °F (36.7 °C)    TempSrc: Oral    SpO2: 96%    Weight: 81.2 kg (179 lb)    Height: 1.753 m (5' 9\")         \"Have you been to the ER, urgent care clinic since your last visit?  Hospitalized since your last visit?\"    NO    “Have you seen or consulted any other health care providers outside of [facility] since your last visit?”    NO

## 2024-11-20 NOTE — PROGRESS NOTES
Medicare Annual Wellness Visit    Adarsh Mukherjee is here for Medicare AWV (Pt reports for AAWV, no concerns today)    Assessment & Plan   Medicare annual wellness visit, subsequent  Type 2 diabetes mellitus with diabetic polyneuropathy, without long-term current use of insulin ([facility])  -      DIABETES FOOT EXAM  -     Microalbumin / Creatinine Urine Ratio; Future  -     Lipid Panel; Future  -     Comprehensive Metabolic Panel; Future  -     Hemoglobin A1C; Future  -     CBC; Future    Recommendations for Preventive Services Due: see orders and patient instructions/AVS.  Recommended screening schedule for the next 5-10 years is provided to the patient in written form: see Patient Instructions/AVS.     Return in 3 months (on 2/20/2025).     Subjective     Patient's complete Health Risk Assessment and screening values have been reviewed and are found in Flowsheets. The following problems were reviewed today and where indicated follow up appointments were made and/or referrals ordered.    Positive Risk Factor Screenings with Interventions:                 Dentist Screen:  Have you seen the dentist within the past year?: (!) No    Intervention:  Advised to schedule with their dentist          Advanced Directives:  Do you have a Living Will?: (!) No    Intervention:  has NO advanced directive - not interested in additional information    Advance Care Planning   The patient has the following advanced directives on file:  Advance Directives       Power of  Living Will ACP-Advance Directive ACP-Power of     Not on File Not on File Not on File Not on File            The patient has appointed the following active healthcare agents:    Primary Decision Maker: Razia Mukherjee - Spouse - [phone]    The Patient has the following current code status:    Code Status: Prior    Visit Documentation:  I discussed Advance Care Planning with Adarsh Mukherjee today which included the importance of making their

## 2024-11-20 NOTE — PATIENT INSTRUCTIONS
preventive eye exam performed by an eye specialist is recommended every 1-2 years to screen for glaucoma; cataracts, macular degeneration, and other eye disorders.  A preventive dental visit is recommended every 6 months.  Try to get at least 150 minutes of exercise per week or 10,000 steps per day on a pedometer .  Order or download the FREE \"Exercise & Physical Activity: Your Everyday Guide\" from [employer]. Call 1-919.293.3260 or search [employer] online.  You need 3286-2491 mg of calcium and 1549-4232 IU of vitamin D per day. It is possible to meet your calcium requirement with diet alone, but a vitamin D supplement is usually necessary to meet this goal.  When exposed to the sun, use a sunscreen that protects against both UVA and UVB radiation with an SPF of 30 or greater. Reapply every 2 to 3 hours or after sweating, drying off with a towel, or swimming.  Always wear a seat belt when traveling in a car. Always wear a helmet when riding a bicycle or motorcycle.

## 2024-12-17 DIAGNOSIS — E11.9 CONTROLLED TYPE 2 DIABETES MELLITUS WITHOUT COMPLICATION, WITHOUT LONG-TERM CURRENT USE OF INSULIN (HCC): ICD-10-CM

## 2024-12-18 RX ORDER — BLOOD-GLUCOSE METER
KIT MISCELLANEOUS
Qty: 100 STRIP | Refills: 3 | Status: SHIPPED | OUTPATIENT
Start: 2024-12-18

## 2025-02-13 ENCOUNTER — OFFICE VISIT (OUTPATIENT)
Age: 75
End: 2025-02-13
Payer: MEDICARE

## 2025-02-13 VITALS
DIASTOLIC BLOOD PRESSURE: 81 MMHG | HEART RATE: 70 BPM | WEIGHT: 183.6 LBS | RESPIRATION RATE: 18 BRPM | BODY MASS INDEX: 27.19 KG/M2 | TEMPERATURE: 98.6 F | SYSTOLIC BLOOD PRESSURE: 125 MMHG | OXYGEN SATURATION: 94 % | HEIGHT: 69 IN

## 2025-02-13 DIAGNOSIS — E11.69 HYPERLIPIDEMIA ASSOCIATED WITH TYPE 2 DIABETES MELLITUS (HCC): ICD-10-CM

## 2025-02-13 DIAGNOSIS — E78.5 HYPERLIPIDEMIA ASSOCIATED WITH TYPE 2 DIABETES MELLITUS (HCC): ICD-10-CM

## 2025-02-13 DIAGNOSIS — I10 ESSENTIAL (PRIMARY) HYPERTENSION: ICD-10-CM

## 2025-02-13 DIAGNOSIS — E11.59 TYPE 2 DIABETES MELLITUS WITH OTHER CIRCULATORY COMPLICATION, WITHOUT LONG-TERM CURRENT USE OF INSULIN (HCC): Primary | ICD-10-CM

## 2025-02-13 DIAGNOSIS — Z95.1 PRESENCE OF AORTOCORONARY BYPASS GRAFT: ICD-10-CM

## 2025-02-13 PROCEDURE — 99214 OFFICE O/P EST MOD 30 MIN: CPT

## 2025-02-13 RX ORDER — ASPIRIN 81 MG/1
81 TABLET, CHEWABLE ORAL DAILY
COMMUNITY

## 2025-02-13 RX ORDER — ROSUVASTATIN CALCIUM 40 MG/1
40 TABLET, COATED ORAL DAILY
COMMUNITY

## 2025-02-13 ASSESSMENT — PATIENT HEALTH QUESTIONNAIRE - PHQ9
SUM OF ALL RESPONSES TO PHQ QUESTIONS 1-9: 0
SUM OF ALL RESPONSES TO PHQ9 QUESTIONS 1 & 2: 0
2. FEELING DOWN, DEPRESSED OR HOPELESS: NOT AT ALL
1. LITTLE INTEREST OR PLEASURE IN DOING THINGS: NOT AT ALL
SUM OF ALL RESPONSES TO PHQ QUESTIONS 1-9: 0

## 2025-02-13 NOTE — ASSESSMENT & PLAN NOTE
Anticipate an improvement in A1c with increase in dose of jardiance though this may not be at goal given recent dietary changes and decrease in exercise/level of activity. Plan to increase in amount of exercise and maintain low carb diet    Orders:    Albumin/Creatinine Ratio, Urine; Future    Albumin/Creatinine Ratio, Urine    CBC with Auto Differential    Comprehensive Metabolic Panel    Hemoglobin A1C

## 2025-02-13 NOTE — PROGRESS NOTES
Identified pt with two pt identifiers(name and ). Reviewed record in preparation for visit and have obtained necessary documentation.  Chief Complaint   Patient presents with    Follow-up Chronic Condition        Health Maintenance Due   Topic    Diabetic retinal exam     Shingles vaccine (1 of 2)    Respiratory Syncytial Virus (RSV) Pregnant or age 60 yrs+ (1 - Risk 60-74 years 1-dose series)    Pneumococcal 50+ years Vaccine (2 of 2 - PCV)    COVID-19 Vaccine ( season)    Annual Wellness Visit (Medicare Advantage)        Vitals:    25 1546   BP: 125/81   Site: Right Upper Arm   Position: Sitting   Cuff Size: Medium Adult   Pulse: 70   Resp: 18   Temp: 98.6 °F (37 °C)   TempSrc: Oral   SpO2: 94%   Weight: 83.3 kg (183 lb 9.6 oz)   Height: 1.753 m (5' 9\")         \"Have you been to the ER, urgent care clinic since your last visit?  Hospitalized since your last visit?\"    NO    “Have you seen or consulted any other health care providers outside of [facility] since your last visit?”    NO            Click Here for Release of Records Request     This patient is accompanied in the office by his self.  I have received verbal consent from Adarsh Mukherjee to discuss any/all medical information while they are present in the room.  
or age 60 yrs+ (1 - Risk 60-74 years 1-dose series) Never done    Pneumococcal 50+ years Vaccine (2 of 2 - PCV) 11/01/2023    COVID-19 Vaccine (6 - 2024-25 season) 09/01/2024    Annual Wellness Visit (Medicare Advantage)  01/01/2025        Please note that this dictation may have been completed with Dragon, the computer voice recognition software.  Quite often unanticipated grammatical, syntax, homophones, and other interpretive errors are inadvertently transcribed by the computer software.  Please disregard these errors.  Please excuse any errors that have escaped final proofreading.      The patient was seen and examined with Attending Dr Rosemarie Polo MD     An electronic signature was used to authenticate this note.  --Florinda Kingston MD     Medications / Allergies     Allergies   Allergen Reactions    Amoxicillin Hives       Current Medications  Prior to Admission medications    Medication Sig Start Date End Date Taking? Authorizing Provider   aspirin 81 MG chewable tablet Take 1 tablet by mouth daily   Yes ProviderBess MD   rosuvastatin (CRESTOR) 40 MG tablet Take 1 tablet by mouth daily   Yes ProviderBess MD   FREESTYLE LITE strip USE AS DIRECTED TO CHECK BLOOD SUGAR DAILY AS NEEDED 12/18/24  Yes Florinda Kingston MD   metFORMIN (GLUCOPHAGE) 1000 MG tablet Take 1 tablet by mouth 2 times daily (with meals) 11/1/24  Yes Florinda Kingston MD   empagliflozin (JARDIANCE) 25 MG tablet Take 1 tablet by mouth daily 11/12/24  Yes Florinda Kingston MD   Multiple Vitamins-Minerals (CENTRUM SILVER PO) Take by mouth   Yes ProvidereBss MD   escitalopram (LEXAPRO) 5 MG tablet Take 2 tablets by mouth daily 1/7/21  Yes Automatic Reconciliation, Ar   losartan (COZAAR) 25 MG tablet Take 1 tablet by mouth daily 10/26/21  Yes Automatic Reconciliation, Ar   ferrous sulfate (FEROSUL) 325 (65 Fe) MG tablet TAKE ONE TABLET BY MOUTH EVERY MORNING BEFORE BREAKFAST  Patient not taking: Reported on 9/17/2024

## 2025-02-14 LAB
ALBUMIN SERPL-MCNC: 4 G/DL (ref 3.5–5)
ALBUMIN/GLOB SERPL: 1.1 (ref 1.1–2.2)
ALP SERPL-CCNC: 97 U/L (ref 45–117)
ALT SERPL-CCNC: 17 U/L (ref 12–78)
ANION GAP SERPL CALC-SCNC: 8 MMOL/L (ref 2–12)
AST SERPL-CCNC: 16 U/L (ref 15–37)
BASOPHILS # BLD: 0.06 K/UL (ref 0–0.1)
BASOPHILS NFR BLD: 1.1 % (ref 0–1)
BILIRUB SERPL-MCNC: 0.5 MG/DL (ref 0.2–1)
BUN SERPL-MCNC: 24 MG/DL (ref 6–20)
BUN/CREAT SERPL: 19 (ref 12–20)
CALCIUM SERPL-MCNC: 9.9 MG/DL (ref 8.5–10.1)
CHLORIDE SERPL-SCNC: 107 MMOL/L (ref 97–108)
CHOLEST SERPL-MCNC: 138 MG/DL
CO2 SERPL-SCNC: 24 MMOL/L (ref 21–32)
CREAT SERPL-MCNC: 1.29 MG/DL (ref 0.7–1.3)
CREAT UR-MCNC: 74.9 MG/DL
DIFFERENTIAL METHOD BLD: ABNORMAL
EOSINOPHIL # BLD: 0.16 K/UL (ref 0–0.4)
EOSINOPHIL NFR BLD: 2.8 % (ref 0–7)
ERYTHROCYTE [DISTWIDTH] IN BLOOD BY AUTOMATED COUNT: 14.2 % (ref 11.5–14.5)
EST. AVERAGE GLUCOSE BLD GHB EST-MCNC: 183 MG/DL
GLOBULIN SER CALC-MCNC: 3.5 G/DL (ref 2–4)
GLUCOSE SERPL-MCNC: 147 MG/DL (ref 65–100)
HBA1C MFR BLD: 8 % (ref 4–5.6)
HCT VFR BLD AUTO: 41.1 % (ref 36.6–50.3)
HDLC SERPL-MCNC: 46 MG/DL
HDLC SERPL: 3 (ref 0–5)
HGB BLD-MCNC: 12.7 G/DL (ref 12.1–17)
IMM GRANULOCYTES # BLD AUTO: 0.02 K/UL (ref 0–0.04)
IMM GRANULOCYTES NFR BLD AUTO: 0.4 % (ref 0–0.5)
LDLC SERPL CALC-MCNC: 55 MG/DL (ref 0–100)
LYMPHOCYTES # BLD: 0.74 K/UL (ref 0.8–3.5)
LYMPHOCYTES NFR BLD: 12.9 % (ref 12–49)
MCH RBC QN AUTO: 27.5 PG (ref 26–34)
MCHC RBC AUTO-ENTMCNC: 30.9 G/DL (ref 30–36.5)
MCV RBC AUTO: 89.2 FL (ref 80–99)
MICROALBUMIN UR-MCNC: 1.63 MG/DL
MICROALBUMIN/CREAT UR-RTO: 22 MG/G (ref 0–30)
MONOCYTES # BLD: 0.51 K/UL (ref 0–1)
MONOCYTES NFR BLD: 9 % (ref 5–13)
NEUTS SEG # BLD: 4.21 K/UL (ref 1.8–8)
NEUTS SEG NFR BLD: 73.8 % (ref 32–75)
NRBC # BLD: 0 K/UL (ref 0–0.01)
NRBC BLD-RTO: 0 PER 100 WBC
PLATELET # BLD AUTO: 237 K/UL (ref 150–400)
PMV BLD AUTO: 10.5 FL (ref 8.9–12.9)
POTASSIUM SERPL-SCNC: 4.4 MMOL/L (ref 3.5–5.1)
PROT SERPL-MCNC: 7.5 G/DL (ref 6.4–8.2)
RBC # BLD AUTO: 4.61 M/UL (ref 4.1–5.7)
RBC MORPH BLD: ABNORMAL
RBC MORPH BLD: ABNORMAL
SODIUM SERPL-SCNC: 139 MMOL/L (ref 136–145)
TRIGL SERPL-MCNC: 185 MG/DL
VLDLC SERPL CALC-MCNC: 37 MG/DL
WBC # BLD AUTO: 5.7 K/UL (ref 4.1–11.1)

## 2025-02-15 ASSESSMENT — ENCOUNTER SYMPTOMS
CHEST TIGHTNESS: 0
SHORTNESS OF BREATH: 0
COUGH: 0

## 2025-04-08 ENCOUNTER — TELEPHONE (OUTPATIENT)
Age: 75
End: 2025-04-08

## 2025-04-08 NOTE — TELEPHONE ENCOUNTER
[place] insurance rep calling to let Dr. Kingston know that the pt updated risk assessment (OTTONIEL) and care plan are available and can be reviewed in the provider portal.

## 2025-04-23 ENCOUNTER — APPOINTMENT (OUTPATIENT)
Facility: HOSPITAL | Age: 75
DRG: 603 | End: 2025-04-23
Payer: MEDICARE

## 2025-04-23 ENCOUNTER — ANCILLARY PROCEDURE (OUTPATIENT)
Age: 75
End: 2025-04-23
Payer: MEDICARE

## 2025-04-23 ENCOUNTER — HOSPITAL ENCOUNTER (INPATIENT)
Facility: HOSPITAL | Age: 75
LOS: 2 days | Discharge: HOME | DRG: 603 | End: 2025-04-25
Attending: STUDENT IN AN ORGANIZED HEALTH CARE EDUCATION/TRAINING PROGRAM | Admitting: FAMILY MEDICINE
Payer: MEDICARE

## 2025-04-23 ENCOUNTER — OFFICE VISIT (OUTPATIENT)
Age: 75
End: 2025-04-23
Payer: MEDICARE

## 2025-04-23 VITALS
WEIGHT: 181.6 LBS | DIASTOLIC BLOOD PRESSURE: 64 MMHG | OXYGEN SATURATION: 93 % | TEMPERATURE: 98.7 F | HEART RATE: 83 BPM | SYSTOLIC BLOOD PRESSURE: 122 MMHG | HEIGHT: 69 IN | RESPIRATION RATE: 18 BRPM | BODY MASS INDEX: 26.9 KG/M2

## 2025-04-23 DIAGNOSIS — M25.561 RIGHT KNEE PAIN, UNSPECIFIED CHRONICITY: ICD-10-CM

## 2025-04-23 DIAGNOSIS — M25.561 RIGHT KNEE PAIN, UNSPECIFIED CHRONICITY: Primary | ICD-10-CM

## 2025-04-23 DIAGNOSIS — M25.561 ACUTE PAIN OF RIGHT KNEE: Primary | ICD-10-CM

## 2025-04-23 DIAGNOSIS — L03.90 CELLULITIS, UNSPECIFIED CELLULITIS SITE: ICD-10-CM

## 2025-04-23 LAB
ALBUMIN SERPL-MCNC: 3.7 G/DL (ref 3.5–5)
ALBUMIN/GLOB SERPL: 0.9 (ref 1.1–2.2)
ALP SERPL-CCNC: 90 U/L (ref 45–117)
ALT SERPL-CCNC: 15 U/L (ref 12–78)
ANION GAP SERPL CALC-SCNC: 6 MMOL/L (ref 2–12)
AST SERPL-CCNC: 9 U/L (ref 15–37)
BASOPHILS # BLD: 0.05 K/UL (ref 0–0.1)
BASOPHILS NFR BLD: 0.5 % (ref 0–1)
BILIRUB SERPL-MCNC: 0.3 MG/DL (ref 0.2–1)
BUN SERPL-MCNC: 23 MG/DL (ref 6–20)
BUN/CREAT SERPL: 19 (ref 12–20)
CALCIUM SERPL-MCNC: 9.4 MG/DL (ref 8.5–10.1)
CHLORIDE SERPL-SCNC: 110 MMOL/L (ref 97–108)
CO2 SERPL-SCNC: 24 MMOL/L (ref 21–32)
COMMENT:: NORMAL
CREAT SERPL-MCNC: 1.18 MG/DL (ref 0.7–1.3)
CRP SERPL-MCNC: 2.53 MG/DL (ref 0–0.3)
DIFFERENTIAL METHOD BLD: ABNORMAL
EOSINOPHIL # BLD: 0.16 K/UL (ref 0–0.4)
EOSINOPHIL NFR BLD: 1.7 % (ref 0–7)
ERYTHROCYTE [DISTWIDTH] IN BLOOD BY AUTOMATED COUNT: 14.8 % (ref 11.5–14.5)
ERYTHROCYTE [SEDIMENTATION RATE] IN BLOOD: 65 MM/HR (ref 0–20)
GLOBULIN SER CALC-MCNC: 4.3 G/DL (ref 2–4)
GLUCOSE BLD STRIP.AUTO-MCNC: 182 MG/DL (ref 65–117)
GLUCOSE SERPL-MCNC: 138 MG/DL (ref 65–100)
HCT VFR BLD AUTO: 36.8 % (ref 36.6–50.3)
HGB BLD-MCNC: 12 G/DL (ref 12.1–17)
IMM GRANULOCYTES # BLD AUTO: 0.06 K/UL (ref 0–0.04)
IMM GRANULOCYTES NFR BLD AUTO: 0.6 % (ref 0–0.5)
LACTATE SERPL-SCNC: 1.2 MMOL/L (ref 0.4–2)
LYMPHOCYTES # BLD: 0.82 K/UL (ref 0.8–3.5)
LYMPHOCYTES NFR BLD: 8.6 % (ref 12–49)
MCH RBC QN AUTO: 27.9 PG (ref 26–34)
MCHC RBC AUTO-ENTMCNC: 32.6 G/DL (ref 30–36.5)
MCV RBC AUTO: 85.6 FL (ref 80–99)
MONOCYTES # BLD: 0.87 K/UL (ref 0–1)
MONOCYTES NFR BLD: 9.1 % (ref 5–13)
NEUTS SEG # BLD: 7.63 K/UL (ref 1.8–8)
NEUTS SEG NFR BLD: 79.5 % (ref 32–75)
NRBC # BLD: 0 K/UL (ref 0–0.01)
NRBC BLD-RTO: 0 PER 100 WBC
PLATELET # BLD AUTO: 268 K/UL (ref 150–400)
PMV BLD AUTO: 9.5 FL (ref 8.9–12.9)
POTASSIUM SERPL-SCNC: 3.8 MMOL/L (ref 3.5–5.1)
PROCALCITONIN SERPL-MCNC: <0.05 NG/ML
PROT SERPL-MCNC: 8 G/DL (ref 6.4–8.2)
RBC # BLD AUTO: 4.3 M/UL (ref 4.1–5.7)
SERVICE CMNT-IMP: ABNORMAL
SODIUM SERPL-SCNC: 140 MMOL/L (ref 136–145)
SPECIMEN HOLD: NORMAL
WBC # BLD AUTO: 9.6 K/UL (ref 4.1–11.1)

## 2025-04-23 PROCEDURE — 3078F DIAST BP <80 MM HG: CPT | Performed by: FAMILY MEDICINE

## 2025-04-23 PROCEDURE — 99214 OFFICE O/P EST MOD 30 MIN: CPT | Performed by: FAMILY MEDICINE

## 2025-04-23 PROCEDURE — 6360000002 HC RX W HCPCS: Performed by: STUDENT IN AN ORGANIZED HEALTH CARE EDUCATION/TRAINING PROGRAM

## 2025-04-23 PROCEDURE — 84145 PROCALCITONIN (PCT): CPT

## 2025-04-23 PROCEDURE — 3074F SYST BP LT 130 MM HG: CPT | Performed by: FAMILY MEDICINE

## 2025-04-23 PROCEDURE — 2500000003 HC RX 250 WO HCPCS

## 2025-04-23 PROCEDURE — 85025 COMPLETE CBC W/AUTO DIFF WBC: CPT

## 2025-04-23 PROCEDURE — 86140 C-REACTIVE PROTEIN: CPT

## 2025-04-23 PROCEDURE — 2500000003 HC RX 250 WO HCPCS: Performed by: STUDENT IN AN ORGANIZED HEALTH CARE EDUCATION/TRAINING PROGRAM

## 2025-04-23 PROCEDURE — 73562 X-RAY EXAM OF KNEE 3: CPT

## 2025-04-23 PROCEDURE — 6360000002 HC RX W HCPCS

## 2025-04-23 PROCEDURE — 36415 COLL VENOUS BLD VENIPUNCTURE: CPT

## 2025-04-23 PROCEDURE — 6370000000 HC RX 637 (ALT 250 FOR IP)

## 2025-04-23 PROCEDURE — 80053 COMPREHEN METABOLIC PANEL: CPT

## 2025-04-23 PROCEDURE — 1159F MED LIST DOCD IN RCRD: CPT | Performed by: FAMILY MEDICINE

## 2025-04-23 PROCEDURE — 83605 ASSAY OF LACTIC ACID: CPT

## 2025-04-23 PROCEDURE — 1100000000 HC RM PRIVATE

## 2025-04-23 PROCEDURE — 99285 EMERGENCY DEPT VISIT HI MDM: CPT

## 2025-04-23 PROCEDURE — 1123F ACP DISCUSS/DSCN MKR DOCD: CPT | Performed by: FAMILY MEDICINE

## 2025-04-23 PROCEDURE — 82962 GLUCOSE BLOOD TEST: CPT

## 2025-04-23 PROCEDURE — 85652 RBC SED RATE AUTOMATED: CPT

## 2025-04-23 PROCEDURE — 99222 1ST HOSP IP/OBS MODERATE 55: CPT | Performed by: NURSE PRACTITIONER

## 2025-04-23 RX ORDER — ACETAMINOPHEN 325 MG/1
650 TABLET ORAL EVERY 6 HOURS PRN
Status: DISCONTINUED | OUTPATIENT
Start: 2025-04-23 | End: 2025-04-25 | Stop reason: HOSPADM

## 2025-04-23 RX ORDER — ONDANSETRON 2 MG/ML
4 INJECTION INTRAMUSCULAR; INTRAVENOUS EVERY 6 HOURS PRN
Status: DISCONTINUED | OUTPATIENT
Start: 2025-04-23 | End: 2025-04-25 | Stop reason: HOSPADM

## 2025-04-23 RX ORDER — ESCITALOPRAM OXALATE 10 MG/1
10 TABLET ORAL DAILY
Status: DISCONTINUED | OUTPATIENT
Start: 2025-04-24 | End: 2025-04-25 | Stop reason: HOSPADM

## 2025-04-23 RX ORDER — DEXTROSE MONOHYDRATE 100 MG/ML
INJECTION, SOLUTION INTRAVENOUS CONTINUOUS PRN
Status: DISCONTINUED | OUTPATIENT
Start: 2025-04-23 | End: 2025-04-25 | Stop reason: HOSPADM

## 2025-04-23 RX ORDER — POLYETHYLENE GLYCOL 3350 17 G/17G
17 POWDER, FOR SOLUTION ORAL DAILY PRN
Status: DISCONTINUED | OUTPATIENT
Start: 2025-04-23 | End: 2025-04-25 | Stop reason: HOSPADM

## 2025-04-23 RX ORDER — ACETAMINOPHEN 650 MG/1
650 SUPPOSITORY RECTAL EVERY 6 HOURS PRN
Status: DISCONTINUED | OUTPATIENT
Start: 2025-04-23 | End: 2025-04-25 | Stop reason: HOSPADM

## 2025-04-23 RX ORDER — ASPIRIN 81 MG/1
81 TABLET, CHEWABLE ORAL DAILY
Status: DISCONTINUED | OUTPATIENT
Start: 2025-04-24 | End: 2025-04-25 | Stop reason: HOSPADM

## 2025-04-23 RX ORDER — SODIUM CHLORIDE 0.9 % (FLUSH) 0.9 %
5-40 SYRINGE (ML) INJECTION PRN
Status: DISCONTINUED | OUTPATIENT
Start: 2025-04-23 | End: 2025-04-25 | Stop reason: HOSPADM

## 2025-04-23 RX ORDER — LOSARTAN POTASSIUM 25 MG/1
25 TABLET ORAL DAILY
Status: DISCONTINUED | OUTPATIENT
Start: 2025-04-24 | End: 2025-04-25 | Stop reason: HOSPADM

## 2025-04-23 RX ORDER — ONDANSETRON 4 MG/1
4 TABLET, ORALLY DISINTEGRATING ORAL EVERY 8 HOURS PRN
Status: DISCONTINUED | OUTPATIENT
Start: 2025-04-23 | End: 2025-04-25 | Stop reason: HOSPADM

## 2025-04-23 RX ORDER — SODIUM CHLORIDE 0.9 % (FLUSH) 0.9 %
5-40 SYRINGE (ML) INJECTION EVERY 12 HOURS SCHEDULED
Status: DISCONTINUED | OUTPATIENT
Start: 2025-04-23 | End: 2025-04-25 | Stop reason: HOSPADM

## 2025-04-23 RX ORDER — ROSUVASTATIN CALCIUM 10 MG/1
40 TABLET, COATED ORAL NIGHTLY
Status: DISCONTINUED | OUTPATIENT
Start: 2025-04-23 | End: 2025-04-25 | Stop reason: HOSPADM

## 2025-04-23 RX ORDER — SODIUM CHLORIDE 9 MG/ML
INJECTION, SOLUTION INTRAVENOUS PRN
Status: DISCONTINUED | OUTPATIENT
Start: 2025-04-23 | End: 2025-04-25 | Stop reason: HOSPADM

## 2025-04-23 RX ORDER — INSULIN LISPRO 100 [IU]/ML
0-8 INJECTION, SOLUTION INTRAVENOUS; SUBCUTANEOUS
Status: DISCONTINUED | OUTPATIENT
Start: 2025-04-23 | End: 2025-04-25 | Stop reason: HOSPADM

## 2025-04-23 RX ORDER — ENOXAPARIN SODIUM 100 MG/ML
40 INJECTION SUBCUTANEOUS DAILY
Status: DISCONTINUED | OUTPATIENT
Start: 2025-04-23 | End: 2025-04-25 | Stop reason: HOSPADM

## 2025-04-23 RX ADMIN — Medication 3 MG: at 23:28

## 2025-04-23 RX ADMIN — WATER 2000 MG: 1 INJECTION INTRAMUSCULAR; INTRAVENOUS; SUBCUTANEOUS at 19:46

## 2025-04-23 RX ADMIN — ROSUVASTATIN CALCIUM 40 MG: 10 TABLET, FILM COATED ORAL at 20:59

## 2025-04-23 RX ADMIN — INSULIN LISPRO 2 UNITS: 100 INJECTION, SOLUTION INTRAVENOUS; SUBCUTANEOUS at 20:43

## 2025-04-23 RX ADMIN — SODIUM CHLORIDE, PRESERVATIVE FREE 10 ML: 5 INJECTION INTRAVENOUS at 20:45

## 2025-04-23 RX ADMIN — ENOXAPARIN SODIUM 40 MG: 100 INJECTION SUBCUTANEOUS at 20:45

## 2025-04-23 RX ADMIN — ACETAMINOPHEN 650 MG: 325 TABLET ORAL at 23:28

## 2025-04-23 ASSESSMENT — PAIN DESCRIPTION - ORIENTATION: ORIENTATION: RIGHT

## 2025-04-23 ASSESSMENT — PAIN SCALES - GENERAL
PAINLEVEL_OUTOF10: 3
PAINLEVEL_OUTOF10: 5

## 2025-04-23 ASSESSMENT — PAIN DESCRIPTION - LOCATION: LOCATION: KNEE

## 2025-04-23 ASSESSMENT — PAIN - FUNCTIONAL ASSESSMENT: PAIN_FUNCTIONAL_ASSESSMENT: NONE - DENIES PAIN

## 2025-04-23 ASSESSMENT — PAIN DESCRIPTION - DESCRIPTORS: DESCRIPTORS: ACHING

## 2025-04-23 NOTE — CONSULTS
ORTHOPAEDIC CONSULT NOTE    Subjective:     Date of Consultation:  April 23, 2025      Adarsh Mukherjee is a 74 y.o. male who is being seen for L knee pain with erythema and mild medial edema, pt states symptoms started this past Sunday with the redness noted this AM. Pt ws seen at his PCP office today and was sent over for eval for possible septic knee. Pt states the pain in his knee improves with ambulation and has min pain at rest. No fevers or chills, pt did have a cold last week but was was feeling better.     Patient Active Problem List    Diagnosis Date Noted    Type 2 diabetes mellitus with diabetic polyneuropathy, without long-term current use of insulin ([facility]) 09/17/2024    Diabetes mellitus ([facility]) 09/17/2024    Coronary artery disease involving native heart 04/06/2021    S/P CABG x 4 04/06/2021    ETOH abuse 04/06/2021    Controlled type 2 diabetes mellitus without complication, without long-term current use of insulin 03/09/2021    Hypertension associated with diabetes ([facility]) 03/09/2021    Pure hypercholesterolemia 03/09/2021    Vitamin D deficiency 03/09/2021     Family History   Problem Relation Age of Onset    Cancer Father     Heart Attack Mother       Social History     Tobacco Use    Smoking status: Never     Passive exposure: Never    Smokeless tobacco: Never   Substance Use Topics    Alcohol use: Not Currently     Past Medical History:   Diagnosis Date    Diabetes ([facility])     Hypercholesterolemia     Hypertension     MI (myocardial infarction) ([facility])       Past Surgical History:   Procedure Laterality Date    COLONOSCOPY N/A 6/5/2023    COLONOSCOPY performed by Hemanth Cruz MD at [facility] ENDOSCOPY    CORONARY ARTERY BYPASS GRAFT      TONSILLECTOMY      UPPER GASTROINTESTINAL ENDOSCOPY N/A 6/5/2023    ESOPHAGOGASTRODUODENOSCOPY performed by Hemanth Cruz MD at [facility] ENDOSCOPY    UPPER GASTROINTESTINAL ENDOSCOPY  6/5/2023    EGD BIOPSY performed by Hemanth Cruz MD at [facility] ENDOSCOPY      Prior to Admission  Time: 04/23/25  2:37 PM   Result Value Ref Range    Lactic Acid, Plasma 1.2 0.4 - 2.0 MMOL/L   Extra Tubes Hold    Collection Time: 04/23/25  2:37 PM   Result Value Ref Range    Specimen HOld RED,SST     Comment:        Add-on orders for these samples will be processed based on acceptable specimen integrity and analyte stability, which may vary by analyte.   Sedimentation Rate    Collection Time: 04/23/25  2:37 PM   Result Value Ref Range    Sed Rate, Automated 65 (H) 0 - 20 mm/hr   Procalcitonin    Collection Time: 04/23/25  2:37 PM   Result Value Ref Range    Procalcitonin <0.05 ng/mL         Impression:     Patient Active Problem List    Diagnosis Date Noted    Type 2 diabetes mellitus with diabetic polyneuropathy, without long-term current use of insulin ([facility]) 09/17/2024    Diabetes mellitus ([facility]) 09/17/2024    Coronary artery disease involving native heart 04/06/2021    S/P CABG x 4 04/06/2021    ETOH abuse 04/06/2021    Controlled type 2 diabetes mellitus without complication, without long-term current use of insulin 03/09/2021    Hypertension associated with diabetes ([facility]) 03/09/2021    Pure hypercholesterolemia 03/09/2021    Vitamin D deficiency 03/09/2021     Active Problems:    * No active hospital problems. *  Resolved Problems:    * No resolved hospital problems. *      Plan:   -  RLE Cellulitis/ knee pain without effusion - low suspicion for septic jt in light of exam, procalc negative, pt does have an elevated ESR and CRP which can be be high with cellulitis, advise to admit for IV abx overnight and recheck in AM, will trend inflammatory markers. Ortho to follow       Dr. Esteban aware and agrees with plan as above.        SIMÓN Bojorquez - NP  Orthopedic Nurse Practitioner   [facility]

## 2025-04-23 NOTE — PROGRESS NOTES
Identified pt with two pt identifiers(name and ). Reviewed record in preparation for visit and have obtained necessary documentation.  Chief Complaint   Patient presents with    Knee Pain     RT knee      Pt here today for RT knee pain and swelling since . Pt has been applying ice, denies any injury.       Vitals:    25 1307   BP: 122/64   BP Site: Right Upper Arm   Patient Position: Sitting   BP Cuff Size: Medium Adult   Pulse: 83   Resp: 18   Temp: 98.7 °F (37.1 °C)   TempSrc: Temporal   SpO2: 93%   Weight: 82.4 kg (181 lb 9.6 oz)   Height: 1.753 m (5' 9\")         Coordination of Care Questionnaire:  :     \"Have you been to the ER, urgent care clinic since your last visit?  Hospitalized since your last visit?\"    NO    “Have you seen or consulted any other health care providers outside of [facility] since your last visit?”    NO            Click Here for Release of Records Request

## 2025-04-23 NOTE — ED TRIAGE NOTES
Pt arrives to the ER after visiting the Sports medicine Doctor with [facility] and referred the pt to the ER for possible infection in the Right knee    Pt st his knee started to bother him on Sunday and the lateral part of the L knee was the most sensitive . In triage the leg is red and swollen.    Pt denies any fevers     Pt sts the dr told the pt he has calcified blood vessels in the right knee and arthritis

## 2025-04-23 NOTE — ED PROVIDER NOTES
[facility] EMERGENCY DEPARTMENT  EMERGENCY DEPARTMENT ENCOUNTER      Pt Name: Adarsh Mukherjee  MRN: [id number]  Birthdate 1950  Date of evaluation: 4/23/2025  Provider: Ban Laguna DO    CHIEF COMPLAINT       Chief Complaint   Patient presents with    Knee Pain       PMH   Past Medical History:   Diagnosis Date    Diabetes (HCC)     Hypercholesterolemia     Hypertension     MI (myocardial infarction) (HCC)          MDM:   Vitals:    Vitals:    04/23/25 1730   BP: 129/76   Pulse:    Resp:    Temp:    SpO2: 93%           This is a 74 y.o. male with pmhx diabetes, HTN, HLD, CAD who presents today for cc of right knee pain . Patient was at his [facility] sports physician office and complained of right knee pain with movement, 5/10 on the pain scale when moving and 1/10 at rest. Notes an area of redness medially which is new. No injections or wounds . No weakness or numbness. Otherwise denies chest pain, dyspnea, fever, chills, abdominal pain, nausea, vomiting, urinary symptoms, and changes in bowel habits.    On arrival VS stable.     Physical Exam  General: Alert, no acute distress  HEENT: Normocephalic, atraumatic. EOMI, moist oral mucosa, no conjunctival injection  Neck: ROM normal, supple  Cardio: Heart regular rate and regular rhythm, cap refill <2seconds  Lungs: no respiratory distress  MSK: limited ROM R knee due to pain, patch of erythema medially without induration, crepitus, fluctuance, 2+ PT pulses b/l with 5/5 strength and distal sensation intact  Skin: Warm, dry, no rash  Neuro: No focal neurodeficits, AOx3     Labs show mild elevations in ESR/CRP. Patient declined repeat xr, which was negative outpatient. Discussed with ortho, who suspect superficial cellulitis and less likely septic arthritis. Recommend treating cellulitis and will re-eval tomorrow. Patient agreeable with admission and iv abx given.      Diagnosis is knee pain, cellulitis. Disposition is Admission to

## 2025-04-23 NOTE — PROGRESS NOTES
[facility]   [facility]      Chief Complaint:   Chief Complaint   Patient presents with    Knee Pain     RT knee       HPI:  Adarsh Mukherjee is a 74 y.o. male who presents with right knee pain.   Reports swelling and pain 4 days.  No injury or trauma.  He noticed redness on the medial aspect of the knee.  He noticed the redness spreading this morning.  No fever or chills.  H/o PAD.          Past Medical History:   Diagnosis Date    Diabetes (HCC)     Hypercholesterolemia     Hypertension     MI (myocardial infarction) (HCC)        Current Outpatient Medications:     aspirin 81 MG chewable tablet, Take 1 tablet by mouth daily, Disp: , Rfl:     rosuvastatin (CRESTOR) 40 MG tablet, Take 1 tablet by mouth daily, Disp: , Rfl:     FREESTYLE LITE strip, USE AS DIRECTED TO CHECK BLOOD SUGAR DAILY AS NEEDED, Disp: 100 strip, Rfl: 3    metFORMIN (GLUCOPHAGE) 1000 MG tablet, Take 1 tablet by mouth 2 times daily (with meals), Disp: 180 tablet, Rfl: 3    empagliflozin (JARDIANCE) 25 MG tablet, Take 1 tablet by mouth daily, Disp: 90 tablet, Rfl: 3    Multiple Vitamins-Minerals (CENTRUM SILVER PO), Take by mouth, Disp: , Rfl:     aspirin 325 MG tablet, TAKE ONE TABLET BY MOUTH ONE TIME DAILY, Disp: 90 tablet, Rfl: 1    escitalopram (LEXAPRO) 5 MG tablet, Take 2 tablets by mouth daily, Disp: , Rfl:     losartan (COZAAR) 25 MG tablet, Take 1 tablet by mouth daily, Disp: , Rfl:     miSOPROStol (CYTOTEC) 200 MCG tablet, Take 1 tablet by mouth 2 times daily (Patient not taking: Reported on 4/23/2025), Disp: , Rfl:     atorvastatin (LIPITOR) 40 MG tablet, Take 1 tablet by mouth daily (Patient not taking: Reported on 4/23/2025), Disp: , Rfl:     metoprolol succinate (TOPROL XL) 25 MG extended release tablet, Take 0.5 tablets by mouth daily (Patient not taking: Reported on 4/23/2025), Disp: , Rfl:   Allergies   Allergen Reactions    Amoxicillin Hives

## 2025-04-24 PROBLEM — M25.561 ACUTE PAIN OF RIGHT KNEE: Status: ACTIVE | Noted: 2025-04-24

## 2025-04-24 PROBLEM — I10 ESSENTIAL HYPERTENSION: Status: ACTIVE | Noted: 2021-03-09

## 2025-04-24 LAB
ANION GAP SERPL CALC-SCNC: 8 MMOL/L (ref 2–12)
APPEARANCE SNV: ABNORMAL
BASOPHILS # BLD: 0.06 K/UL (ref 0–0.1)
BASOPHILS NFR BLD: 0.7 % (ref 0–1)
BODY FLD TYPE: NORMAL
BUN SERPL-MCNC: 27 MG/DL (ref 6–20)
BUN/CREAT SERPL: 20 (ref 12–20)
CALCIUM SERPL-MCNC: 8.7 MG/DL (ref 8.5–10.1)
CHLORIDE SERPL-SCNC: 110 MMOL/L (ref 97–108)
CO2 SERPL-SCNC: 23 MMOL/L (ref 21–32)
COLOR SNV: YELLOW
CREAT SERPL-MCNC: 1.37 MG/DL (ref 0.7–1.3)
CRP SERPL-MCNC: 4.8 MG/DL (ref 0–0.3)
CRYSTALS FLD MICRO: NORMAL
DIFFERENTIAL METHOD BLD: ABNORMAL
EOSINOPHIL # BLD: 0.21 K/UL (ref 0–0.4)
EOSINOPHIL NFR BLD: 2.5 % (ref 0–7)
ERYTHROCYTE [DISTWIDTH] IN BLOOD BY AUTOMATED COUNT: 14.8 % (ref 11.5–14.5)
ERYTHROCYTE [SEDIMENTATION RATE] IN BLOOD: 58 MM/HR (ref 0–20)
GLUCOSE BLD STRIP.AUTO-MCNC: 166 MG/DL (ref 65–117)
GLUCOSE BLD STRIP.AUTO-MCNC: 196 MG/DL (ref 65–117)
GLUCOSE BLD STRIP.AUTO-MCNC: 246 MG/DL (ref 65–117)
GLUCOSE BLD STRIP.AUTO-MCNC: 310 MG/DL (ref 65–117)
GLUCOSE SERPL-MCNC: 282 MG/DL (ref 65–100)
HCT VFR BLD AUTO: 33.6 % (ref 36.6–50.3)
HGB BLD-MCNC: 10.9 G/DL (ref 12.1–17)
IMM GRANULOCYTES # BLD AUTO: 0.05 K/UL (ref 0–0.04)
IMM GRANULOCYTES NFR BLD AUTO: 0.6 % (ref 0–0.5)
LYMPHOCYTES # BLD: 0.85 K/UL (ref 0.8–3.5)
LYMPHOCYTES NFR BLD: 9.9 % (ref 12–49)
LYMPHOCYTES NFR SNV MANUAL: 17 % (ref 0–15)
MCH RBC QN AUTO: 27.7 PG (ref 26–34)
MCHC RBC AUTO-ENTMCNC: 32.4 G/DL (ref 30–36.5)
MCV RBC AUTO: 85.3 FL (ref 80–99)
MONOCYTES # BLD: 1.07 K/UL (ref 0–1)
MONOCYTES NFR BLD: 12.5 % (ref 5–13)
MONOCYTES NFR SNV MANUAL: 70 % (ref 0–65)
NEUTROPHILS NFR SNV MANUAL: 13 % (ref 0–20)
NEUTS SEG # BLD: 6.32 K/UL (ref 1.8–8)
NEUTS SEG NFR BLD: 73.8 % (ref 32–75)
NRBC # BLD: 0 K/UL (ref 0–0.01)
NRBC BLD-RTO: 0 PER 100 WBC
PLATELET # BLD AUTO: 244 K/UL (ref 150–400)
PMV BLD AUTO: 9.3 FL (ref 8.9–12.9)
POTASSIUM SERPL-SCNC: 4 MMOL/L (ref 3.5–5.1)
RBC # BLD AUTO: 3.94 M/UL (ref 4.1–5.7)
RBC # SNV: >100 /CU MM
SERVICE CMNT-IMP: ABNORMAL
SODIUM SERPL-SCNC: 141 MMOL/L (ref 136–145)
SPECIMEN SOURCE FLD: ABNORMAL
WBC # BLD AUTO: 8.6 K/UL (ref 4.1–11.1)
WBC # SNV: 1045 /CU MM (ref 0–150)

## 2025-04-24 PROCEDURE — 99233 SBSQ HOSP IP/OBS HIGH 50: CPT | Performed by: NURSE PRACTITIONER

## 2025-04-24 PROCEDURE — 6360000002 HC RX W HCPCS

## 2025-04-24 PROCEDURE — 20610 DRAIN/INJ JOINT/BURSA W/O US: CPT | Performed by: NURSE PRACTITIONER

## 2025-04-24 PROCEDURE — 85025 COMPLETE CBC W/AUTO DIFF WBC: CPT

## 2025-04-24 PROCEDURE — 89060 EXAM SYNOVIAL FLUID CRYSTALS: CPT

## 2025-04-24 PROCEDURE — 99223 1ST HOSP IP/OBS HIGH 75: CPT | Performed by: FAMILY MEDICINE

## 2025-04-24 PROCEDURE — 97116 GAIT TRAINING THERAPY: CPT

## 2025-04-24 PROCEDURE — 1100000000 HC RM PRIVATE

## 2025-04-24 PROCEDURE — 87070 CULTURE OTHR SPECIMN AEROBIC: CPT

## 2025-04-24 PROCEDURE — 94761 N-INVAS EAR/PLS OXIMETRY MLT: CPT

## 2025-04-24 PROCEDURE — 89050 BODY FLUID CELL COUNT: CPT

## 2025-04-24 PROCEDURE — 2500000003 HC RX 250 WO HCPCS

## 2025-04-24 PROCEDURE — 6370000000 HC RX 637 (ALT 250 FOR IP)

## 2025-04-24 PROCEDURE — 6370000000 HC RX 637 (ALT 250 FOR IP): Performed by: FAMILY MEDICINE

## 2025-04-24 PROCEDURE — 2580000003 HC RX 258

## 2025-04-24 PROCEDURE — 85652 RBC SED RATE AUTOMATED: CPT

## 2025-04-24 PROCEDURE — 86140 C-REACTIVE PROTEIN: CPT

## 2025-04-24 PROCEDURE — 82962 GLUCOSE BLOOD TEST: CPT

## 2025-04-24 PROCEDURE — 87205 SMEAR GRAM STAIN: CPT

## 2025-04-24 PROCEDURE — 36415 COLL VENOUS BLD VENIPUNCTURE: CPT

## 2025-04-24 PROCEDURE — 97161 PT EVAL LOW COMPLEX 20 MIN: CPT

## 2025-04-24 PROCEDURE — 80048 BASIC METABOLIC PNL TOTAL CA: CPT

## 2025-04-24 PROCEDURE — 0S9C3ZZ DRAINAGE OF RIGHT KNEE JOINT, PERCUTANEOUS APPROACH: ICD-10-PCS | Performed by: NURSE PRACTITIONER

## 2025-04-24 RX ORDER — DIPHENHYDRAMINE HCL 25 MG
25 CAPSULE ORAL EVERY 6 HOURS PRN
Status: DISCONTINUED | OUTPATIENT
Start: 2025-04-24 | End: 2025-04-25 | Stop reason: HOSPADM

## 2025-04-24 RX ORDER — SODIUM CHLORIDE 9 MG/ML
INJECTION, SOLUTION INTRAVENOUS CONTINUOUS
Status: DISCONTINUED | OUTPATIENT
Start: 2025-04-24 | End: 2025-04-25

## 2025-04-24 RX ADMIN — WATER 2000 MG: 1 INJECTION INTRAMUSCULAR; INTRAVENOUS; SUBCUTANEOUS at 11:51

## 2025-04-24 RX ADMIN — LOSARTAN POTASSIUM 25 MG: 25 TABLET, FILM COATED ORAL at 08:13

## 2025-04-24 RX ADMIN — INSULIN LISPRO 6 UNITS: 100 INJECTION, SOLUTION INTRAVENOUS; SUBCUTANEOUS at 17:21

## 2025-04-24 RX ADMIN — WATER 2000 MG: 1 INJECTION INTRAMUSCULAR; INTRAVENOUS; SUBCUTANEOUS at 04:15

## 2025-04-24 RX ADMIN — ESCITALOPRAM OXALATE 10 MG: 10 TABLET ORAL at 08:13

## 2025-04-24 RX ADMIN — SODIUM CHLORIDE: 0.9 INJECTION, SOLUTION INTRAVENOUS at 08:21

## 2025-04-24 RX ADMIN — INSULIN LISPRO 2 UNITS: 100 INJECTION, SOLUTION INTRAVENOUS; SUBCUTANEOUS at 08:13

## 2025-04-24 RX ADMIN — WATER 2000 MG: 1 INJECTION INTRAMUSCULAR; INTRAVENOUS; SUBCUTANEOUS at 18:41

## 2025-04-24 RX ADMIN — ASPIRIN 81 MG: 81 TABLET, CHEWABLE ORAL at 08:13

## 2025-04-24 RX ADMIN — SODIUM CHLORIDE, PRESERVATIVE FREE 10 ML: 5 INJECTION INTRAVENOUS at 08:14

## 2025-04-24 RX ADMIN — SODIUM CHLORIDE: 0.9 INJECTION, SOLUTION INTRAVENOUS at 21:04

## 2025-04-24 RX ADMIN — DIPHENHYDRAMINE HYDROCHLORIDE 25 MG: 25 CAPSULE ORAL at 23:30

## 2025-04-24 RX ADMIN — ROSUVASTATIN CALCIUM 40 MG: 10 TABLET, FILM COATED ORAL at 20:51

## 2025-04-24 RX ADMIN — INSULIN LISPRO 2 UNITS: 100 INJECTION, SOLUTION INTRAVENOUS; SUBCUTANEOUS at 20:51

## 2025-04-24 RX ADMIN — ENOXAPARIN SODIUM 40 MG: 100 INJECTION SUBCUTANEOUS at 20:51

## 2025-04-24 RX ADMIN — ACETAMINOPHEN 650 MG: 325 TABLET ORAL at 23:30

## 2025-04-24 RX ADMIN — SODIUM CHLORIDE, PRESERVATIVE FREE 10 ML: 5 INJECTION INTRAVENOUS at 20:54

## 2025-04-24 ASSESSMENT — PAIN DESCRIPTION - LOCATION: LOCATION: KNEE

## 2025-04-24 ASSESSMENT — PAIN DESCRIPTION - ORIENTATION: ORIENTATION: RIGHT

## 2025-04-24 ASSESSMENT — PAIN SCALES - GENERAL: PAINLEVEL_OUTOF10: 3

## 2025-04-24 ASSESSMENT — PAIN DESCRIPTION - DESCRIPTORS: DESCRIPTORS: ACHING

## 2025-04-24 NOTE — PLAN OF CARE
Problem: Physical Therapy - Adult  Goal: By Discharge: Performs mobility at highest level of function for planned discharge setting.  See evaluation for individualized goals.  Description: FUNCTIONAL STATUS PRIOR TO ADMISSION: Patient was independent and active without use of DME.    HOME SUPPORT PRIOR TO ADMISSION: The patient lived with spouse with all needs met 1st floor, 3 stairs with rail to enter. Independent ADL's.    Physical Therapy Goals  Initiated 4/24/2025  1.  Patient will move from supine to sit and sit to supine in bed with independence within 7 day(s).    2.  Patient will perform sit to stand with independence within 7 day(s).  3.  Patient will transfer from bed to chair and chair to bed with independence using the least restrictive device within 7 day(s).  4.  Patient will ambulate with independence for 200 feet with the least restrictive device within 7 day(s).   5.  Patient will ascend/descend 3 stairs with 1 handrail(s) with modified independence within 7 day(s).   6.  Patient will demonstrate right knee AROM at least 0-90 deg within 7 day(s).  Outcome: Progressing     PHYSICAL THERAPY EVALUATION    Patient: Adarsh Mukherjee (74 y.o. male)  Date: 4/24/2025  Primary Diagnosis: Cellulitis [L03.90]  Acute pain of right knee [M25.561]  Cellulitis, unspecified cellulitis site [L03.90]       Precautions: Restrictions/Precautions  Restrictions/Precautions: General Precautions            ASSESSMENT:  Patient is a 74 y.o. male with h/o DM2, CABG, ETOH, HTN admitted to [facility] on 4/23/25 diagnosed with right lower extremity cellulitis. Patient seen for PT evaluation at this time and is agreeable to participation.     DEFICITS/IMPAIRMENTS:   The patient is limited by decreased functional mobility, ROM, strength, increased pain levels .    Based on the impairments listed above, the patient is functioning below baseline level of function. He is able to ambulate with short steps at

## 2025-04-24 NOTE — PROGRESS NOTES
Ortho update:    S: Pt resting in bed with wife at bedside. Pt states knee feels about the \"same\".    O: VSS  Sed rate elevated today- CRP 4.8 from 2.5, sed rate 58 from 65.  Right knee with moderate sized effusion. Erythema on medial aspect of knee, marked with marker. Erythema seems to have improved in redness to looking less inflammed. Erythema has not exceeded beyond markings. Pt only able to flex knee to about 40 degrees with moderate amount of pain throughout knee joint.     A: Cellulitis medial aspect right knee  New moderate sized joint effusion.    P: For completeness and due to elevation of CRP, worsening joint effusion, will tap joint to send aspirate to lab to rule out septic joint.   Procedure: After verbal consent for aspiration and under sterile conditions, after thoroughly cleansing skin with betadine and alcohol, I injected 10 ml of Lidocaine 1% for local anesthetic. I proceeded with aspiration from the superior lateral approach of the right knee, aspirated 25 ml of clear yellow fluid from the right knee . Fluid was sent to lab for Gram stain, Culture and cell count, cyrstals.  Dressing applied.  Patient tolerated procedure well. Wrapped knee in ace bandage.  Will follow up after labs resulted and notify Dr. Larsen.    Ana Giron, NP  Orthopedic Trauma NP [facility]

## 2025-04-24 NOTE — PLAN OF CARE
Problem: Chronic Conditions and Co-morbidities  Goal: Patient's chronic conditions and co-morbidity symptoms are monitored and maintained or improved  4/24/2025 0957 by Narcisa Durán RN  Outcome: Progressing  4/23/2025 2225 by Fercho Mathur RN  Outcome: Progressing     Problem: Discharge Planning  Goal: Discharge to home or other facility with appropriate resources  4/24/2025 0957 by Narcisa Durán RN  Outcome: Progressing  4/23/2025 2225 by Fercho Mathur RN  Outcome: Progressing     Problem: Safety - Adult  Goal: Free from fall injury  4/24/2025 0957 by Narcisa Durán RN  Outcome: Progressing  4/23/2025 2225 by Fercho Mathur RN  Outcome: Progressing

## 2025-04-24 NOTE — ED NOTES
TRANSFER - OUT REPORT:    Verbal report given to Tunde BARNARD on Adarsh Mukherjee  being transferred to 4th floor  for routine progression of patient care       Report consisted of patient's Situation, Background, Assessment and   Recommendations(SBAR).     Information from the following report(s) Nurse Handoff Report, ED Encounter Summary, and ED SBAR was reviewed with the receiving nurse.    [place] Fall Assessment:    Presents to emergency department  because of falls (Syncope, seizure, or loss of consciousness): No  Age > 70: No  Altered Mental Status, Intoxication with alcohol or substance confusion (Disorientation, impaired judgment, poor safety awaremess, or inability to follow instructions): No  Impaired Mobility: Ambulates or transfers with assistive devices or assistance; Unable to ambulate or transer.: No  Nursing Judgement: No          Lines:   Peripheral IV 04/23/25 Right Antecubital (Active)   Site Assessment Clean, dry & intact 04/23/25 1816   Line Status Blood return noted 04/23/25 1816   Phlebitis Assessment No symptoms 04/23/25 1816   Infiltration Assessment 0 04/23/25 1816   Dressing Status New dressing applied;Clean, dry & intact 04/23/25 1816   Dressing Type Transparent 04/23/25 1816   Dressing Intervention New 04/23/25 1816        Opportunity for questions and clarification was provided.      Patient transported with:  Registered Nurse

## 2025-04-24 NOTE — PROGRESS NOTES
[address] [place] [address]   Office [phone]  Fax [phone]          Subjective / Objective     Subjective  Overnight Events: Admitted overnight, see H&P.   This AM, reports pain in his knee is improving. Was able to walk to the bathroom but had some difficulty. Range of motion still limited from pain. No further sxs or concerns.     Respiratory: RA  /69   Pulse 77   Temp 97.7 °F (36.5 °C) (Oral)   Resp 18   Ht 1.753 m (5' 9\")   Wt 82.1 kg (181 lb)   SpO2 92%   BMI 26.73 kg/m²    Physical Examination:   General appearance - alert, well appearing, and in no distress  Chest - clear to auscultation, no wheezes, rales or rhonchi, symmetric air entry  Heart - normal rate, regular rhythm, normal S1, S2, no murmurs, rubs, clicks or gallops,   Abdomen - soft, nontender, nondistended, no masses or organomegaly  Neurological - alert, oriented, normal speech, no focal findings  Skin - Erythema present over medial knee, extending posteriorly. Has not spread out of demarcated area.  Extremities - Right knee is non-tender to palpation. Range of motion limited to ~30 degrees of flexion 2/2 pain. 2+ pedal pulses bilaterally.  Psychiatric - normal speech and thought processes    I/O:  04/23 0701 - 04/24 0700  In: 10 [I.V.:10]  Out: -   Inpatient Medications  Current Facility-Administered Medications   Medication Dose Route Frequency    0.9 % sodium chloride infusion   IntraVENous Continuous    sodium chloride flush 0.9 % injection 5-40 mL  5-40 mL IntraVENous 2 times per day    sodium chloride flush 0.9 % injection 5-40 mL  5-40 mL IntraVENous PRN    0.9 % sodium chloride infusion   IntraVENous PRN    enoxaparin (LOVENOX) injection 40 mg  40 mg SubCUTAneous Daily    ondansetron (ZOFRAN-ODT) disintegrating tablet 4 mg  4 mg Oral Q8H PRN    Or    ondansetron (ZOFRAN) injection 4 mg  4 mg IntraVENous Q6H PRN    melatonin tablet 3 mg  3 mg Oral Nightly PRN    polyethylene glycol (GLYCOLAX)

## 2025-04-24 NOTE — H&P
[address] [id number]   Office [phone]  Fax [phone]       Admission H&P     Name: Adarsh Mukherjee MRN: [id number]  Sex: Male   YOB: 1950  Age: 74 y.o.  PCP: Florinda Kingston MD     Source of Information: patient, medical records    Chief complaint: right knee pain & redness    History of Present Illness  Adarsh Mukherjee is a 74 y.o. male with known history of T2DM, CAD s/p bypass, HTN, HLD, depression who presents to the ER complaining of right knee pain & redness.  Patient reports a painful bump on right knee on Sunday; patient thought he bumped his knee.  Next day right knee was hurting all day patient applied ice on it in the evening.  On Tuesday knee was still hurting so patient applied warm water compress and removed it around 12:31 AM.  At that time patient reported skin was red and swollen. Patient had the warm compress exactly where redness is located. Warm compress was on the skin of 3-4 hours.  Patient reports after walking ~20 feet right knee starts to loosen up a little bit so he is able to shuffle a bit.  Patient reports when knee quickly tightens up and there is pain.  Patient denies any fever, chills, nausea, vomiting, diarrhea, constipation, dysuria, blood in urine, blood in stool.    In the ER:   Labs Reviewed   CBC WITH AUTO DIFFERENTIAL - Abnormal; Notable for the following components:       Result Value    Hemoglobin 12.0 (*)     RDW 14.8 (*)     Neutrophils % 79.5 (*)     Lymphocytes % 8.6 (*)     Immature Granulocytes % 0.6 (*)     Immature Granulocytes Absolute 0.06 (*)     All other components within normal limits   COMPREHENSIVE METABOLIC PANEL - Abnormal; Notable for the following components:    Chloride 110 (*)     Glucose 138 (*)     BUN 23 (*)     AST 9 (*)     Globulin 4.3 (*)     Albumin/Globulin Ratio 0.9 (*)     All other components within normal limits   SEDIMENTATION RATE - Abnormal; Notable for the following components:     Sed Rate, Automated 65 (*)     All other components within normal limits   C-REACTIVE PROTEIN - Abnormal; Notable for the following components:    CRP 2.53 (*)     All other components within normal limits   LACTIC ACID   EXTRA TUBES HOLD   PROCALCITONIN   CBC WITH AUTO DIFFERENTIAL   BASIC METABOLIC PANEL   POCT GLUCOSE   POCT GLUCOSE        Vitals:  Patient Vitals for the past 8 hrs:   Temp Pulse Resp BP SpO2   04/23/25 1951 98.4 °F (36.9 °C) 76 16 -- --   04/23/25 1730 -- -- -- 129/76 93 %   04/23/25 1715 -- -- -- 124/73 92 %   04/23/25 1700 -- -- -- 120/71 92 %   04/23/25 1645 -- -- -- 124/74 94 %   04/23/25 1630 -- -- -- 117/81 94 %   04/23/25 1615 -- -- -- 124/74 94 %   04/23/25 1600 -- -- -- 130/75 99 %   04/23/25 1545 -- -- -- 125/71 95 %   04/23/25 1530 -- -- -- 125/67 95 %   04/23/25 1515 -- -- -- 124/71 94 %   04/23/25 1500 -- -- -- 128/73 96 %   04/23/25 1429 97.3 °F (36.3 °C) 87 18 135/71 97 %       Vitals:    04/23/25 1951   BP:    Pulse: 76   Resp: 16   Temp: 98.4 °F (36.9 °C)   SpO2:        Labs:   Recent Labs     04/23/25  1437   WBC 9.6   HGB 12.0*   HCT 36.8        Recent Labs     04/23/25  1437      K 3.8   *   CO2 24   BUN 23*     Recent Labs     04/23/25  1437   GLOB 4.3*     Imaging: none    Treatment: S/p ancef 2g    EKG: none    Home Medications   Prior to Admission medications    Medication Sig Start Date End Date Taking? Authorizing Provider   aspirin 81 MG chewable tablet Take 1 tablet by mouth daily    Bess Burt MD   rosuvastatin (CRESTOR) 40 MG tablet Take 1 tablet by mouth daily    Bess Burt MD   FREESTYLE LITE strip USE AS DIRECTED TO CHECK BLOOD SUGAR DAILY AS NEEDED 12/18/24   Florinda Kingston MD   metFORMIN (GLUCOPHAGE) 1000 MG tablet Take 1 tablet by mouth 2 times daily (with meals) 11/1/24   Florinda Kingston MD   empagliflozin (JARDIANCE) 25 MG tablet Take 1 tablet by mouth daily 11/12/24   Florinda Kingston MD   miSOPROStol (CYTOTEC) 200

## 2025-04-24 NOTE — CARE COORDINATION
Care Management Progress Note    Reason for Admission:   Cellulitis [L03.90]  Acute pain of right knee [M25.561]  Cellulitis, unspecified cellulitis site [L03.90]         Patient Admission Status: Inpatient  RUR: 10 % low  Hospitalization in the last 30 days (Readmission):  No        Transition of care plan:  Medical management ongoing. Ortho consulted.     Physical therapy eval pending. Right knee immobilizer ordered.      CM reviewed EMR. No CM needs indicated at this time. Providers, if needs arise, please consult case management.     Cinthia Cadet RN, [facility]  [employer] Care Management    Available via Pickup Services

## 2025-04-25 VITALS
SYSTOLIC BLOOD PRESSURE: 145 MMHG | DIASTOLIC BLOOD PRESSURE: 88 MMHG | RESPIRATION RATE: 20 BRPM | OXYGEN SATURATION: 94 % | TEMPERATURE: 98.2 F | WEIGHT: 181 LBS | BODY MASS INDEX: 26.81 KG/M2 | HEIGHT: 69 IN | HEART RATE: 75 BPM

## 2025-04-25 PROBLEM — M25.461 EFFUSION, RIGHT KNEE: Status: ACTIVE | Noted: 2025-04-25

## 2025-04-25 LAB
ANION GAP SERPL CALC-SCNC: 6 MMOL/L (ref 2–12)
BACTERIA SPEC CULT: NORMAL
BASOPHILS # BLD: 0.06 K/UL (ref 0–0.1)
BASOPHILS NFR BLD: 0.7 % (ref 0–1)
BUN SERPL-MCNC: 20 MG/DL (ref 6–20)
BUN/CREAT SERPL: 19 (ref 12–20)
CALCIUM SERPL-MCNC: 8.3 MG/DL (ref 8.5–10.1)
CHLORIDE SERPL-SCNC: 112 MMOL/L (ref 97–108)
CO2 SERPL-SCNC: 24 MMOL/L (ref 21–32)
CREAT SERPL-MCNC: 1.04 MG/DL (ref 0.7–1.3)
DIFFERENTIAL METHOD BLD: ABNORMAL
EOSINOPHIL # BLD: 0.31 K/UL (ref 0–0.4)
EOSINOPHIL NFR BLD: 3.8 % (ref 0–7)
ERYTHROCYTE [DISTWIDTH] IN BLOOD BY AUTOMATED COUNT: 14.7 % (ref 11.5–14.5)
GLUCOSE BLD STRIP.AUTO-MCNC: 125 MG/DL (ref 65–117)
GLUCOSE BLD STRIP.AUTO-MCNC: 174 MG/DL (ref 65–117)
GLUCOSE SERPL-MCNC: 109 MG/DL (ref 65–100)
GRAM STN SPEC: NORMAL
HCT VFR BLD AUTO: 31.8 % (ref 36.6–50.3)
HGB BLD-MCNC: 10.1 G/DL (ref 12.1–17)
IMM GRANULOCYTES # BLD AUTO: 0.05 K/UL (ref 0–0.04)
IMM GRANULOCYTES NFR BLD AUTO: 0.6 % (ref 0–0.5)
LYMPHOCYTES # BLD: 0.93 K/UL (ref 0.8–3.5)
LYMPHOCYTES NFR BLD: 11.5 % (ref 12–49)
MCH RBC QN AUTO: 27.2 PG (ref 26–34)
MCHC RBC AUTO-ENTMCNC: 31.8 G/DL (ref 30–36.5)
MCV RBC AUTO: 85.7 FL (ref 80–99)
MONOCYTES # BLD: 1.02 K/UL (ref 0–1)
MONOCYTES NFR BLD: 12.6 % (ref 5–13)
NEUTS SEG # BLD: 5.75 K/UL (ref 1.8–8)
NEUTS SEG NFR BLD: 70.8 % (ref 32–75)
NRBC # BLD: 0 K/UL (ref 0–0.01)
NRBC BLD-RTO: 0 PER 100 WBC
PLATELET # BLD AUTO: 235 K/UL (ref 150–400)
PMV BLD AUTO: 9.1 FL (ref 8.9–12.9)
POTASSIUM SERPL-SCNC: 4 MMOL/L (ref 3.5–5.1)
RBC # BLD AUTO: 3.71 M/UL (ref 4.1–5.7)
SERVICE CMNT-IMP: ABNORMAL
SERVICE CMNT-IMP: ABNORMAL
SERVICE CMNT-IMP: NORMAL
SODIUM SERPL-SCNC: 142 MMOL/L (ref 136–145)
WBC # BLD AUTO: 8.1 K/UL (ref 4.1–11.1)

## 2025-04-25 PROCEDURE — 99238 HOSP IP/OBS DSCHRG MGMT 30/<: CPT | Performed by: FAMILY MEDICINE

## 2025-04-25 PROCEDURE — 94761 N-INVAS EAR/PLS OXIMETRY MLT: CPT

## 2025-04-25 PROCEDURE — 6370000000 HC RX 637 (ALT 250 FOR IP)

## 2025-04-25 PROCEDURE — 82962 GLUCOSE BLOOD TEST: CPT

## 2025-04-25 PROCEDURE — 80048 BASIC METABOLIC PNL TOTAL CA: CPT

## 2025-04-25 PROCEDURE — 85025 COMPLETE CBC W/AUTO DIFF WBC: CPT

## 2025-04-25 PROCEDURE — 6360000002 HC RX W HCPCS

## 2025-04-25 PROCEDURE — 2500000003 HC RX 250 WO HCPCS

## 2025-04-25 RX ORDER — SULFAMETHOXAZOLE AND TRIMETHOPRIM 800; 160 MG/1; MG/1
1 TABLET ORAL 2 TIMES DAILY
Qty: 16 TABLET | Refills: 0 | Status: SHIPPED | OUTPATIENT
Start: 2025-04-25 | End: 2025-05-03

## 2025-04-25 RX ORDER — DIPHENHYDRAMINE HCL 25 MG
12.5 TABLET ORAL
Status: DISCONTINUED | OUTPATIENT
Start: 2025-04-25 | End: 2025-04-25

## 2025-04-25 RX ORDER — FAMOTIDINE 20 MG/1
20 TABLET, FILM COATED ORAL
Status: COMPLETED | OUTPATIENT
Start: 2025-04-25 | End: 2025-04-25

## 2025-04-25 RX ORDER — DIPHENHYDRAMINE HCL 12.5 MG/5ML
12.5 SOLUTION ORAL
Status: COMPLETED | OUTPATIENT
Start: 2025-04-25 | End: 2025-04-25

## 2025-04-25 RX ORDER — INSULIN GLARGINE 100 [IU]/ML
5 INJECTION, SOLUTION SUBCUTANEOUS DAILY
Status: DISCONTINUED | OUTPATIENT
Start: 2025-04-25 | End: 2025-04-25 | Stop reason: HOSPADM

## 2025-04-25 RX ORDER — CEPHALEXIN 500 MG/1
500 CAPSULE ORAL 2 TIMES DAILY
Qty: 17 CAPSULE | Refills: 0 | OUTPATIENT
Start: 2025-04-25

## 2025-04-25 RX ADMIN — WATER 2000 MG: 1 INJECTION INTRAMUSCULAR; INTRAVENOUS; SUBCUTANEOUS at 11:57

## 2025-04-25 RX ADMIN — ASPIRIN 81 MG: 81 TABLET, CHEWABLE ORAL at 08:47

## 2025-04-25 RX ADMIN — SODIUM CHLORIDE, PRESERVATIVE FREE 10 ML: 5 INJECTION INTRAVENOUS at 08:48

## 2025-04-25 RX ADMIN — FAMOTIDINE 20 MG: 20 TABLET, FILM COATED ORAL at 11:58

## 2025-04-25 RX ADMIN — ESCITALOPRAM OXALATE 10 MG: 10 TABLET ORAL at 08:47

## 2025-04-25 RX ADMIN — LOSARTAN POTASSIUM 25 MG: 25 TABLET, FILM COATED ORAL at 08:47

## 2025-04-25 RX ADMIN — WATER 2000 MG: 1 INJECTION INTRAMUSCULAR; INTRAVENOUS; SUBCUTANEOUS at 03:47

## 2025-04-25 RX ADMIN — INSULIN GLARGINE 5 UNITS: 100 INJECTION, SOLUTION SUBCUTANEOUS at 08:48

## 2025-04-25 RX ADMIN — DIPHENHYDRAMINE HCL ORAL 12.5 MG: 12.5 SOLUTION ORAL at 11:58

## 2025-04-25 NOTE — DISCHARGE INSTRUCTIONS
HOME DISCHARGE INSTRUCTIONS    Adarsh Mukherjee / [id number] : 1950    Admission date: 2025 Discharge date: 2025     Please bring this form with you to show your care provider at your follow-up appointment.    Primary care provider: Florinda Francis    Discharging provider:  Leticia Montano MD  - Family Medicine Resident  Jaron Wadsworth MD- Family Medicine Attending      You have been admitted to the hospital with the following diagnoses:    ACUTE DIAGNOSES:  Cellulitis [L03.90]  Acute pain of right knee [M25.561]  Cellulitis, unspecified cellulitis site [L03.90]    . . . . . . . . . . . . . . . . . . . . . . . . . . . . . . . . . . . . . . . . . . . . . . . . . . . . . . . . . . . . . . . . . . . . . . . .   You were hospitalized for cellulitis of your right knee. In the hospital you received IV antibiotics which improved your symptoms. The orthopedic surgery team took fluid from your knee and there was no evidence of a joint infection. You are safe to discharge home on antibiotics. You will continue with physical therapy outside of the hospital. Please follow up with your PCP.  . . . . . . . . . . . . . . . . . . . . . . . . . . . . . . . . . . . . . . . . . . . . . . . . . . . . . . . . . . . . . . . . . . . . . . . .       MEDICATION CHANGES  START: Bactrim twice daily for 8 more days    No other changes were made to your medications, please take all your other home medicines as previously prescribed.    FOLLOW-UP CARE RECOMMENDATIONS:   1. Please see your PCP for a hospital follow up to discuss resolution of your symptoms, and any changes made to your medications   2. Please see the orthopedic surgery team for follow-up of your cellulitis.     Appointments  No future appointments.    Follow-up tests needed: none    Pending test results:   At the time of your discharge the following test results are still pending: final joint fluid culture.   Please make sure you review these results  Signature                                                                  Date/Time                                                                                                                                              Patient or Representative Signature                                                          Date/Time

## 2025-04-25 NOTE — PLAN OF CARE
Problem: Chronic Conditions and Co-morbidities  Goal: Patient's chronic conditions and co-morbidity symptoms are monitored and maintained or improved  4/25/2025 0952 by Narcisa Durán RN  Outcome: Progressing  4/25/2025 0006 by Zeferino Gonzalez RN  Outcome: Progressing     Problem: Discharge Planning  Goal: Discharge to home or other facility with appropriate resources  4/25/2025 0952 by Narcisa Durán RN  Outcome: Progressing  4/25/2025 0006 by Zeferino Gonzalez RN  Outcome: Progressing     Problem: Safety - Adult  Goal: Free from fall injury  4/25/2025 0952 by Narcisa Durán RN  Outcome: Progressing  4/25/2025 0006 by Zeferino Gonzalez RN  Outcome: Progressing

## 2025-04-25 NOTE — PLAN OF CARE
Problem: Chronic Conditions and Co-morbidities  Goal: Patient's chronic conditions and co-morbidity symptoms are monitored and maintained or improved  Outcome: Progressing     Problem: Discharge Planning  Goal: Discharge to home or other facility with appropriate resources  Outcome: Progressing     Problem: Safety - Adult  Goal: Free from fall injury  Outcome: Progressing     Problem: Physical Therapy - Adult  Goal: By Discharge: Performs mobility at highest level of function for planned discharge setting.  See evaluation for individualized goals.  Description: FUNCTIONAL STATUS PRIOR TO ADMISSION: Patient was independent and active without use of DME.    HOME SUPPORT PRIOR TO ADMISSION: The patient lived with spouse with all needs met 1st floor, 3 stairs with rail to enter. Independent ADL's.    Physical Therapy Goals  Initiated 4/24/2025  1.  Patient will move from supine to sit and sit to supine in bed with independence within 7 day(s).    2.  Patient will perform sit to stand with independence within 7 day(s).  3.  Patient will transfer from bed to chair and chair to bed with independence using the least restrictive device within 7 day(s).  4.  Patient will ambulate with independence for 200 feet with the least restrictive device within 7 day(s).   5.  Patient will ascend/descend 3 stairs with 1 handrail(s) with modified independence within 7 day(s).   6.  Patient will demonstrate right knee AROM at least 0-90 deg within 7 day(s).  4/24/2025 1044 by Janee Gaffney, PT  Outcome: Progressing

## 2025-04-25 NOTE — CARE COORDINATION
Manolo has been delivered to room, patient to be dc    12:33 PM  Order received for walker and it is being referred for insurance processing prior to delivery to the room     Care Management Progress Note    Reason for Admission:   Cellulitis [L03.90]  Acute pain of right knee [M25.561]  Cellulitis, unspecified cellulitis site [L03.90]         Patient Admission Status: Inpatient  RUR: 10%  Hospitalization in the last 30 days (Readmission):  No        Transition of care plan:  No longer needs immobilizer, discharged planned for today  No post acute CM needs.  Patient confirms his wife will pick him up  Discharge plan communicated with patient and/or discharge caregiver: Yes    Date 1st IMM letter given: 4/24  Outpatient follow-up.  Transport at discharge: wife

## 2025-04-25 NOTE — PROGRESS NOTES
[address] [id number]   Office [phone]  Fax [phone]          Subjective / Objective     Subjective  Overnight Events: ANDRY.   This AM, reports no pain in his knee at rest. Still reporting limited ROM in right knee, unchanged after tap with ortho yesterday. No further sxs/concerns at present.    Respiratory: RA  BP (!) 140/78   Pulse 64   Temp 98.4 °F (36.9 °C) (Oral)   Resp 16   Ht 1.753 m (5' 9\")   Wt 82.1 kg (181 lb)   SpO2 94%   BMI 26.73 kg/m²    Physical Examination:   General appearance - alert, well appearing, and in no distress  Chest - clear to auscultation, no wheezes, rales or rhonchi, symmetric air entry  Heart - normal rate, regular rhythm, normal S1, S2, no murmurs, rubs, clicks or gallops,   Abdomen - soft, nontender, nondistended, no masses or organomegaly  Neurological - alert, oriented, normal speech, no focal findings  Skin - Erythema present over medial knee, extending posteriorly. Has not spread out of demarcated area.  Extremities - Right knee is non-tender to palpation. Range of motion limited to ~40 degrees. Wrapped in ACE bandage.  Psychiatric - normal speech and thought processes    I/O:  04/24 0701 - 04/25 0700  In: 10 [I.V.:10]  Out: -   Inpatient Medications  Current Facility-Administered Medications   Medication Dose Route Frequency    insulin glargine (LANTUS) injection vial 5 Units  5 Units SubCUTAneous Daily    diphenhydrAMINE (BENADRYL) capsule 25 mg  25 mg Oral Q6H PRN    sodium chloride flush 0.9 % injection 5-40 mL  5-40 mL IntraVENous 2 times per day    sodium chloride flush 0.9 % injection 5-40 mL  5-40 mL IntraVENous PRN    0.9 % sodium chloride infusion   IntraVENous PRN    enoxaparin (LOVENOX) injection 40 mg  40 mg SubCUTAneous Daily    ondansetron (ZOFRAN-ODT) disintegrating tablet 4 mg  4 mg Oral Q8H PRN    Or    ondansetron (ZOFRAN) injection 4 mg  4 mg IntraVENous Q6H PRN    melatonin tablet 3 mg  3 mg Oral Nightly PRN       Hypertension: Chronic  - Continuing home medications of losartan 25mg qD.   - Will continue to monitor at this time and readjust as BP's trend.     Diabetes Mellitus T2: Last A1C 8% 2/25.  - Holding home metformin 1000mg BID, jardiance 25mg qD.  - added lantus 5U daily  - Insulin Sliding Scale normal sensitivity with AC&HS glucose checks.  - Hypoglycemia protocols ordered.     Hyperlipidemia: Chronic  Lab Results   Component Value Date    CHOL 138 02/13/2025    TRIG 185 (H) 02/13/2025    HDL 46 02/13/2025    LDL 55 02/13/2025    VLDL 37 02/13/2025    CHOLHDLRATIO 3.0 02/13/2025   -Continue home crestor 40mg qD     CAD s/p bypass: chronic, controlled  - continue home ASA 81mg qD     Depression: chronic, controlled.  - continue home lexapro 10mg qD    FEN/GI - Diabetic diet.   Activity - Ambulate with assistance  DVT prophylaxis - Lovenox  GI prophylaxis - Not indicated at this time  Fall prophylaxis - Not indicated at this time.  Disposition - Plan to d/c to Home.   Code Status - FULL, discussed with patient / caregivers.  Next of Kin Name and Contact: Razia Mukherjee (Spouse)  [phone] (Mobile)     Patient discussed with Falguni Pool MD Alexandra G Ledwith, MD  Family Medicine Resident       For Billing    Chief Complaint   Patient presents with    Knee Pain       Principal Problem:    Cellulitis  Active Problems:    Essential hypertension    Acute pain of right knee  Resolved Problems:    * No resolved hospital problems. *

## 2025-04-25 NOTE — DISCHARGE SUMMARY
Discharge / Transfer / Off-Service Note     Name: Adarsh Mukherjee MRN: [id number]  Sex: Male   YOB: 1950  Age: 74 y.o.  PCP: Florinda Kingston MD     Date of admission: 4/23/2025  Date of discharge/transfer: 4/25/2025    Attending physician at admission: Falguni Pool MD.  Attending physician at discharge/transfer: Jaron Wadsworth MD  Resident physician at discharge/transfer: Leticia Montano MD     Consultants during hospitalization  - Orthopedic surgery     Admission diagnoses   Cellulitis [L03.90]  Acute pain of right knee [M25.561]  Cellulitis, unspecified cellulitis site [L03.90]    Recommended follow-up after discharge    1. PCP-Florinda Kingston MD     Things to follow up on with PCP:   - Hospital follow-up: Admitted for R knee cellulitis s/p IV ancef while inpatient. Initially c/f potential septic joint however joint aspiration reassuring. Discharged on PO abx and will f/u with ortho.  - f/u final joint cultures    Medication Changes:     Medication List        START taking these medications      sulfamethoxazole-trimethoprim 800-160 MG per tablet  Commonly known as: BACTRIM DS;SEPTRA DS  Take 1 tablet by mouth 2 times daily for 8 days            CHANGE how you take these medications      aspirin 81 MG chewable tablet  What changed: Another medication with the same name was removed. Continue taking this medication, and follow the directions you see here.            CONTINUE taking these medications      CENTRUM SILVER PO     empagliflozin 25 MG tablet  Commonly known as: Jardiance  Take 1 tablet by mouth daily     escitalopram 5 MG tablet  Commonly known as: LEXAPRO     FREESTYLE LITE strip  Generic drug: blood glucose test strips  USE AS DIRECTED TO CHECK BLOOD SUGAR DAILY AS NEEDED     losartan 25 MG tablet  Commonly known as: COZAAR     metFORMIN 1000 MG tablet  Commonly known as: GLUCOPHAGE  Take 1 tablet by mouth 2 times daily (with meals)     rosuvastatin 40 MG  from PCP with initial c/f septic arthritis given increasing erythema and limited ROM over right knee. S/p joint aspiration with ortho, which ultimately did not show evidence of septic arthritis. Per ortho, this is most likely cellulitis. S/p IV ancef while inpatient.  - Discharged with PO Bactrim BID for 8 more days to complete 10 day course of abx. Allergy to penicillins (hives)  - outpatient PT    Hypertension: Chronic  - Continue home medications of losartan 25mg qD.      Diabetes Mellitus T2: Last A1C 8% 2/25.  - Continue home metformin 1000mg BID and jardiance 25mg qD.     Hyperlipidemia: Chronic  - Continue home crestor 40mg daily     CAD s/p bypass: chronic  - continue home ASA 81mg qD     Depression: chronic, controlled.  - continue home lexapro 10mg qD    Condition at discharge: Stable.    Labs  Recent Labs     04/23/25  1437 04/24/25  0420 04/25/25  0327   WBC 9.6 8.6 8.1   HGB 12.0* 10.9* 10.1*   HCT 36.8 33.6* 31.8*    244 235     Recent Labs     04/23/25  1437 04/24/25  0420 04/25/25  0327    141 142   K 3.8 4.0 4.0   * 110* 112*   CO2 24 23 24   BUN 23* 27* 20     Recent Labs     04/23/25  1437   ALT 15   GLOB 4.3*     No results for input(s): \"PH\", \"PCO2\", \"PO2\", \"TNIPOC\", \"INR\", \"APTT\", \"TIBC\", \"GLUCPOC\" in the last 72 hours.    Invalid input(s): \"TROIQ\", \"PTP\", \"FE\", \"PSAT\", \"FERR\", \"GLPOC\"    Micro:  No components found for: \"CULT\"    Imaging:  No results found.      Diet: Diabetic diet   Activity:  As tolerated     Disposition: Home or Self Care     Discharge instructions to patient/family  Please seek medical attention for any new or worsening symptoms particularly fever, chest pain, shortness of breath, abdominal pain, nausea, vomiting    Follow up plans/appointments  Follow-up Information       Follow up With Specialties Details Why Contact Info    Florinda Kingston MD Family Medicine Go to hospital follow-up [id number] [facility] [address] [phone]      BON

## 2025-04-28 ENCOUNTER — TELEPHONE (OUTPATIENT)
Age: 75
End: 2025-04-28

## 2025-04-28 LAB
BACTERIA SPEC CULT: NORMAL
BACTERIA SPEC CULT: NORMAL
GRAM STN SPEC: NORMAL
SERVICE CMNT-IMP: NORMAL

## 2025-04-28 NOTE — TELEPHONE ENCOUNTER
Care Transitions Initial Follow Up Call    Outreach made within 2 business days of discharge: Yes    Patient: Adarsh Mukherjee Patient : 1950   MRN: [id number]  Reason for Admission: Cellulitis   Discharge Date: 25       Spoke with: Left Message    Discharge department/facility: [facility]    TCM Interactive Patient Contact:  Called patient; no answer. Left message for patient to return call to the office.     Additional needs identified to be addressed with provider  No needs identified             Scheduled appointment with PCP within 7-14 days    Follow Up  No future appointments.    Randall Bradford RN

## 2025-05-01 NOTE — PROGRESS NOTES
Physician Progress Note      PATIENT:               ALEXY FERNÁNDEZ  CSN #:                  [id number]  :                       1950  ADMIT DATE:       2025 2:40 PM  DISCH DATE:        2025 2:09 PM  RESPONDING  PROVIDER #:        KELI ARRINGTON          QUERY TEXT:    Please document the relationship, if any, between the cellulitis and diabetes:    The clinical indicators include:  Consults 2025- RLE Cellulitis/ knee pain without effusion - low   suspicion for septic jt in light of exam, procalc negative, pt does have an   elevated ESR and CRP which can be be high with cellulitis, advise to admit for   IV abx overnight and recheck in AM, will trend inflammatory markers. Ortho to   follow    Discharge Summary 2025- Hospital follow-up: Admitted for R knee   cellulitis s/p IV ancef while inpatient. Initially c/f potential septic joint   however joint aspiration reassuring. Discharged on PO abx and will f/u with   ortho.    H&P 2025- Diabetes Mellitus T2,Holding home metformin 1000mg BID,   jardiance 25mg qD.    Glucose 138    Insulin, IV cefazolin      Thank you  Erika Knutson CDS [facility]  Options provided:  -- R knee cellulitis related to Diabetes  -- R knee cellulitis unrelated to Diabetes  -- Other - I will add my own diagnosis  -- Disagree - Not applicable / Not valid  -- Disagree - Clinically unable to determine / Unknown  -- Refer to Clinical Documentation Reviewer    PROVIDER RESPONSE TEXT:    Patient with R knee cellulitis.    Query created by: Erika Knutson on 2025 11:40 PM      Electronically signed by:  KELI ARRINGTON 2025 7:00 AM

## 2025-05-06 ENCOUNTER — OFFICE VISIT (OUTPATIENT)
Age: 75
End: 2025-05-06
Payer: MEDICARE

## 2025-05-06 VITALS
TEMPERATURE: 97.8 F | HEART RATE: 72 BPM | HEIGHT: 69 IN | DIASTOLIC BLOOD PRESSURE: 68 MMHG | RESPIRATION RATE: 18 BRPM | SYSTOLIC BLOOD PRESSURE: 133 MMHG | BODY MASS INDEX: 26.42 KG/M2 | WEIGHT: 178.4 LBS | OXYGEN SATURATION: 96 %

## 2025-05-06 DIAGNOSIS — Z09 HOSPITAL DISCHARGE FOLLOW-UP: ICD-10-CM

## 2025-05-06 DIAGNOSIS — L03.115 CELLULITIS OF RIGHT LEG: Primary | ICD-10-CM

## 2025-05-06 DIAGNOSIS — M25.561 RIGHT KNEE PAIN, UNSPECIFIED CHRONICITY: ICD-10-CM

## 2025-05-06 PROCEDURE — 3075F SYST BP GE 130 - 139MM HG: CPT | Performed by: FAMILY MEDICINE

## 2025-05-06 PROCEDURE — 99213 OFFICE O/P EST LOW 20 MIN: CPT | Performed by: FAMILY MEDICINE

## 2025-05-06 PROCEDURE — 3078F DIAST BP <80 MM HG: CPT | Performed by: FAMILY MEDICINE

## 2025-05-06 PROCEDURE — 1123F ACP DISCUSS/DSCN MKR DOCD: CPT | Performed by: FAMILY MEDICINE

## 2025-05-06 PROCEDURE — 1159F MED LIST DOCD IN RCRD: CPT | Performed by: FAMILY MEDICINE

## 2025-05-06 NOTE — PROGRESS NOTES
[facility] Family Medicine [facility]   [facility] Sports Medicine      Chief Complaint:   Chief Complaint   Patient presents with    Follow-Up from Hospital       HPI:  Adarsh Mukherjee is a 74 y.o. male who presents with right  knee pain.       Patient was admitted to [facility] 4/23-4/25 for knee cellulitis with concern for septic arthritis. An aspiration was performed and culture was negative. Patient was diagnosed with R leg cellulitis and discharged on oral Bactrim for 10 day total course.     Today, patient states his knee pain got better on the antibiotics, is now 0/10. Completed course of Bactrim yesterday. Swelling and redness have also resolved. Was not able to put weight on R leg at last clinic visit prior to admission (4/23)  but this has improved. States he is feeling better than home baseline.       Past Medical History:   Diagnosis Date    Diabetes (HCC)     Hypercholesterolemia     Hypertension     MI (myocardial infarction) ([facility])        Current Outpatient Medications:     rosuvastatin (CRESTOR) 40 MG tablet, Take 1 tablet by mouth daily, Disp: , Rfl:     FREESTYLE LITE strip, USE AS DIRECTED TO CHECK BLOOD SUGAR DAILY AS NEEDED, Disp: 100 strip, Rfl: 3    metFORMIN (GLUCOPHAGE) 1000 MG tablet, Take 1 tablet by mouth 2 times daily (with meals), Disp: 180 tablet, Rfl: 3    empagliflozin (JARDIANCE) 25 MG tablet, Take 1 tablet by mouth daily, Disp: 90 tablet, Rfl: 3    Multiple Vitamins-Minerals (CENTRUM SILVER PO), Take by mouth, Disp: , Rfl:     escitalopram (LEXAPRO) 5 MG tablet, Take 2 tablets by mouth daily, Disp: , Rfl:     losartan (COZAAR) 25 MG tablet, Take 1 tablet by mouth daily, Disp: , Rfl:     aspirin 81 MG chewable tablet, Take 1 tablet by mouth daily (Patient not taking: Reported on 5/6/2025), Disp: , Rfl:     miSOPROStol (CYTOTEC) 200 MCG tablet, Take 1 tablet by mouth 2 times daily (Patient not taking: Reported on 5/6/2025), Disp: , Rfl:     metoprolol

## 2025-05-06 NOTE — PROGRESS NOTES
Identified pt with two pt identifiers(name and ). Reviewed record in preparation for visit and have obtained necessary documentation.  Chief Complaint   Patient presents with    Follow-Up from Hospital        Vitals:    25 1104   BP: 133/68   BP Site: Left Upper Arm   Patient Position: Sitting   BP Cuff Size: Medium Adult   Pulse: 72   Resp: 18   Temp: 97.8 °F (36.6 °C)   TempSrc: Temporal   SpO2: 96%   Weight: 80.9 kg (178 lb 6.4 oz)   Height: 1.753 m (5' 9\")         Coordination of Care Questionnaire:  :     \"Have you been to the ER, urgent care clinic since your last visit?  Hospitalized since your last visit?\"    YES - When: approximately 2  weeks ago.  Where and Why: [facility] knee infection.    “Have you seen or consulted any other health care providers outside of [facility] since your last visit?”    NO            Click Here for Release of Records Request

## 2025-07-29 ENCOUNTER — OFFICE VISIT (OUTPATIENT)
Age: 75
End: 2025-07-29
Payer: MEDICARE

## 2025-07-29 VITALS
OXYGEN SATURATION: 95 % | SYSTOLIC BLOOD PRESSURE: 145 MMHG | HEART RATE: 72 BPM | WEIGHT: 183 LBS | DIASTOLIC BLOOD PRESSURE: 74 MMHG | RESPIRATION RATE: 20 BRPM | TEMPERATURE: 98.2 F | BODY MASS INDEX: 27.02 KG/M2

## 2025-07-29 DIAGNOSIS — Z95.1 PRESENCE OF AORTOCORONARY BYPASS GRAFT: ICD-10-CM

## 2025-07-29 DIAGNOSIS — E11.59 TYPE 2 DIABETES MELLITUS WITH OTHER CIRCULATORY COMPLICATION, WITHOUT LONG-TERM CURRENT USE OF INSULIN (HCC): Primary | ICD-10-CM

## 2025-07-29 DIAGNOSIS — I10 ESSENTIAL (PRIMARY) HYPERTENSION: ICD-10-CM

## 2025-07-29 DIAGNOSIS — F41.1 GENERALIZED ANXIETY DISORDER: ICD-10-CM

## 2025-07-29 PROCEDURE — 1123F ACP DISCUSS/DSCN MKR DOCD: CPT

## 2025-07-29 PROCEDURE — 3052F HG A1C>EQUAL 8.0%<EQUAL 9.0%: CPT

## 2025-07-29 PROCEDURE — 99214 OFFICE O/P EST MOD 30 MIN: CPT

## 2025-07-29 PROCEDURE — 3078F DIAST BP <80 MM HG: CPT

## 2025-07-29 PROCEDURE — 3077F SYST BP >= 140 MM HG: CPT

## 2025-07-29 PROCEDURE — 1159F MED LIST DOCD IN RCRD: CPT

## 2025-07-29 RX ORDER — ESCITALOPRAM OXALATE 10 MG/1
10 TABLET ORAL DAILY
Qty: 90 TABLET | Refills: 3 | Status: SHIPPED | OUTPATIENT
Start: 2025-07-29

## 2025-07-29 ASSESSMENT — PATIENT HEALTH QUESTIONNAIRE - PHQ9
SUM OF ALL RESPONSES TO PHQ QUESTIONS 1-9: 0
1. LITTLE INTEREST OR PLEASURE IN DOING THINGS: NOT AT ALL
SUM OF ALL RESPONSES TO PHQ QUESTIONS 1-9: 0
SUM OF ALL RESPONSES TO PHQ QUESTIONS 1-9: 0
2. FEELING DOWN, DEPRESSED OR HOPELESS: NOT AT ALL
SUM OF ALL RESPONSES TO PHQ QUESTIONS 1-9: 0

## 2025-07-29 NOTE — ASSESSMENT & PLAN NOTE
A1c in March 2025 was not at goal at 8.0.  Anticipate improvement with hemoglobin A1c as Jardiance was increased to 25 mg, also made some dietary changes reducing snacking.      Orders:    Hemoglobin A1C; Future    Albumin/Creatinine Ratio, Urine; Future

## 2025-07-30 LAB
ALBUMIN SERPL-MCNC: 4.1 G/DL (ref 3.5–5.2)
ALBUMIN/GLOB SERPL: 1.5 (ref 1.1–2.2)
ALP SERPL-CCNC: 83 U/L (ref 40–129)
ALT SERPL-CCNC: 16 U/L (ref 10–50)
ANION GAP SERPL CALC-SCNC: 10 MMOL/L (ref 2–14)
AST SERPL-CCNC: 20 U/L (ref 10–50)
BILIRUB SERPL-MCNC: 0.3 MG/DL (ref 0–1.2)
BUN SERPL-MCNC: 17 MG/DL (ref 8–23)
BUN/CREAT SERPL: 15 (ref 12–20)
CALCIUM SERPL-MCNC: 10.1 MG/DL (ref 8.8–10.2)
CHLORIDE SERPL-SCNC: 106 MMOL/L (ref 98–107)
CHOLEST SERPL-MCNC: 129 MG/DL (ref 0–200)
CO2 SERPL-SCNC: 25 MMOL/L (ref 20–29)
CREAT SERPL-MCNC: 1.1 MG/DL (ref 0.7–1.2)
CREAT UR-MCNC: 67.1 MG/DL (ref 39–259)
EST. AVERAGE GLUCOSE BLD GHB EST-MCNC: 185 MG/DL
GLOBULIN SER CALC-MCNC: 2.7 G/DL (ref 2–4)
GLUCOSE SERPL-MCNC: 117 MG/DL (ref 65–100)
HBA1C MFR BLD: 8.1 % (ref 4–5.6)
HDLC SERPL-MCNC: 39 MG/DL (ref 40–60)
HDLC SERPL: 3.3
LDLC SERPL CALC-MCNC: 49 MG/DL
MICROALBUMIN UR-MCNC: 1.65 MG/DL
MICROALBUMIN/CREAT UR-RTO: 25 MG/G
POTASSIUM SERPL-SCNC: 4.6 MMOL/L (ref 3.5–5.1)
PROT SERPL-MCNC: 6.8 G/DL (ref 6.4–8.3)
SODIUM SERPL-SCNC: 141 MMOL/L (ref 136–145)
TRIGL SERPL-MCNC: 202 MG/DL (ref 0–150)
VLDLC SERPL CALC-MCNC: 40 MG/DL

## 2025-07-30 NOTE — PROGRESS NOTES
I saw and evaluated the patient, performing the key elements of the service.  I discussed the findings, assessment and plan with the resident and agree with the resident's findings and plan as documented in the resident's note.   
Identified pt with two pt identifiers(name and ). Reviewed record in preparation for visit and have obtained necessary documentation.  Chief Complaint   Patient presents with    Insomnia     Pt reports with sleep issues since stopped taking Lexapro, BP is high high        Health Maintenance Due   Topic    Diabetic retinal exam     Shingles vaccine (1 of 2)    Respiratory Syncytial Virus (RSV) Pregnant or age 60 yrs+ (1 - Risk 60-74 years 1-dose series)    Pneumococcal 50+ years Vaccine (2 of 2 - PCV)    COVID-19 Vaccine ( season)    Annual Wellness Visit (Medicare Advantage)        Vitals:    25 1344 25 1351   BP: (!) 143/79 (!) 145/74   BP Site: Right Upper Arm Left Upper Arm   Patient Position: Sitting Sitting   BP Cuff Size: Medium Adult Medium Adult   Pulse: 72 72   Resp: 20    Temp: 98.2 °F (36.8 °C)    TempSrc: Oral    SpO2: 95%    Weight: 83 kg (183 lb)          \"Have you been to the ER, urgent care clinic since your last visit?  Hospitalized since your last visit?\"    NO    “Have you seen or consulted any other health care providers outside of [facility] since your last visit?”    NO            Click Here for Release of Records Request     This patient is accompanied in the office by his self.  I have received verbal consent from Adarsh Mukherjee to discuss any/all medical information while they are present in the room.  
Capillary refill takes less than 2 seconds.   Neurological:      Mental Status: He is alert.         No results found for this visit on 07/29/25.          Assessment / Plan        Assessment & Plan  Type 2 diabetes mellitus with other circulatory complication, without long-term current use of insulin ([facility])   A1c in March 2025 was not at goal at 8.0.  Anticipate improvement with hemoglobin A1c as Jardiance was increased to 25 mg, also made some dietary changes reducing snacking.      Orders:    Hemoglobin A1C; Future    Albumin/Creatinine Ratio, Urine; Future    Essential (primary) hypertension   Blood pressure control is improving since restarting escitalopram.  Was seen by cardiology with plan to continue current medication which I agree with.    Orders:    Comprehensive Metabolic Panel; Future    Albumin/Creatinine Ratio, Urine; Future    Generalized anxiety disorder   Is previously followed by psychiatry but would like to transfer prescriptions to be followed by us.  Initially had symptoms of insomnia which is improved since restarting Lexapro.    Orders:    escitalopram (LEXAPRO) 10 MG tablet; Take 1 tablet by mouth daily    Presence of aortocoronary bypass graft   Continue current statin medication and check lipid profile.    Orders:    Lipid Panel; Future      No follow-ups on file.       Health Maintenance Due   Topic Date Due    Diabetic retinal exam  Never done    Shingles vaccine (1 of 2) Never done    Respiratory Syncytial Virus (RSV) Pregnant or age 60 yrs+ (1 - Risk 60-74 years 1-dose series) Never done    Pneumococcal 50+ years Vaccine (2 of 2 - PCV) 11/01/2023    COVID-19 Vaccine (6 - 2024-25 season) 09/01/2024    Annual Wellness Visit (Medicare Advantage)  01/01/2025        Please note that this dictation may have been completed with Dragon, the [employer] voice recognition software.  Quite often unanticipated grammatical, syntax, homophones, and other interpretive errors are inadvertently transcribed

## (undated) DEVICE — SENSOR PLSE OXMTR AD CBL L3FT ADH TRANSMISSIVE

## (undated) DEVICE — CUFF BLD PRSS AD SZ 11 FIT 25-34CM LNG SFT 1 TB W/ M BAYNT

## (undated) DEVICE — CANNULA CUSH AD W/ 14FT TBG

## (undated) DEVICE — SYRINGE MED 3ML CLR PLAS STD N CTRL LUERLOCK TIP DISP

## (undated) DEVICE — SOLIDIFIER FLD 2OZ 1500CC N DISINF IN BTL DISP SAFESORB

## (undated) DEVICE — FORCEPS BX L240CM JAW DIA2.8MM L CAP W/ NDL MIC MESH TOOTH

## (undated) DEVICE — BLUNTFILL WITH FILTER: Brand: MONOJECT

## (undated) DEVICE — BLUNTFILL: Brand: MONOJECT

## (undated) DEVICE — BITEBLOCK ENDOSCP 60FR MAXI WHT POLYETH STURDY W/ VELC WVN

## (undated) DEVICE — SYRINGE MED 5ML STD CLR PLAS LUERLOCK TIP N CTRL DISP

## (undated) DEVICE — SET ADMIN 16ML TBNG L100IN 2 Y INJ SITE IV PIGGY BK DISP (ORDER IN MULIPLES OF 48)

## (undated) DEVICE — IV STRT KT 3282] LSL INDUSTRIES INC]

## (undated) DEVICE — KIT COLON W/ 1.1OZ LUB AND 2 END

## (undated) DEVICE — BASIN EMSIS 16OZ GRAPHITE PLAS KID SHP MOLD GRAD FOR ORAL

## (undated) DEVICE — CONTAINER SPEC 20 ML LID NEUT BUFF FORMALIN 10 % POLYPR STS

## (undated) DEVICE — CATHETER IV 22GA L1IN OD0.8382-0.9144MM ID0.6096-0.6858MM

## (undated) DEVICE — ELECTRODE,RADIOTRANSLUCENT,FOAM,3PK: Brand: MEDLINE

## (undated) DEVICE — 1200 GUARD II KIT W/5MM TUBE W/O VAC TUBE: Brand: GUARDIAN